# Patient Record
Sex: FEMALE | Race: WHITE | Employment: FULL TIME | URBAN - METROPOLITAN AREA
[De-identification: names, ages, dates, MRNs, and addresses within clinical notes are randomized per-mention and may not be internally consistent; named-entity substitution may affect disease eponyms.]

---

## 2017-01-16 ENCOUNTER — TELEPHONE (OUTPATIENT)
Dept: OTOLARYNGOLOGY | Facility: CLINIC | Age: 62
End: 2017-01-16

## 2017-01-16 NOTE — TELEPHONE ENCOUNTER
Reason for call: Patient needs documentation/prescription for hearing aid. She is planning to purchase at [facility]. Phone number for [facility] is [phone]. For questions or next steps please contact patient at home and leave detailed message.    Phone Number Pt can be reached at: Home number on file [phone] (home)  Best Time: anytime  Can we leave a detailed message on this number? YES

## 2017-01-17 NOTE — TELEPHONE ENCOUNTER
Spoke with [facility]. They state they dont need a hearing aid clearance form but since the patient is requesting it its ok to send it anyway. No fax available. Will be sending form out by mail.    Maye Magana MA

## 2017-03-16 ENCOUNTER — TELEPHONE (OUTPATIENT)
Dept: OTOLARYNGOLOGY | Facility: CLINIC | Age: 62
End: 2017-03-16

## 2017-03-16 NOTE — TELEPHONE ENCOUNTER
Reason for Call:  Other call back    Detailed comments: [facility] called to discuss the prescription for the left hearing aid, Patient states this was needed for insurance purpose.  Please call to discuss further    Phone Number Patient can be reached at: Other phone number:  [phone]    Best Time: today    Can we leave a detailed message on this number? Not Applicable    Call taken on 3/16/2017 at 10:10 AM by Marycarmen Ramos

## 2017-03-17 NOTE — TELEPHONE ENCOUNTER
Spoke with [facility] on 1/16/17 (see telephone encounter). They stated they didnt need a hearing aid clearance form, but since the patient is requesting it it was ok to send it anyway. No fax number is available so I sent the form out by mail.     Will contact [facility] once again to discuss. They should have received the prescription back in January. If not, a new one will be mailed.

## 2017-03-21 ENCOUNTER — TELEPHONE (OUTPATIENT)
Dept: OTOLARYNGOLOGY | Facility: CLINIC | Age: 62
End: 2017-03-21

## 2017-03-21 NOTE — TELEPHONE ENCOUNTER
Reason for Call:  Other     Detailed comments: Patient states she is still having difficulty to get her hearing purchased from [facility].  Patient would like a call back to discuss same.    Phone Number Patient can be reached at: Home number on file [phone] (home)    Best Time: anytime    Can we leave a detailed message on this number? YES    Call taken on 3/21/2017 at 5:16 PM by Emily Greenwood

## 2017-03-23 ENCOUNTER — TELEPHONE (OUTPATIENT)
Dept: OTOLARYNGOLOGY | Facility: CLINIC | Age: 62
End: 2017-03-23

## 2017-03-23 NOTE — TELEPHONE ENCOUNTER
Reason for Call:  Other call back    Detailed comments: Divina from [facility] has questions regarding a form that was sent to the patient. Please call Divina to clear her confusion regarding forms so the patient is not billed incorrectly.     Phone Number Patient can be reached at: Other phone number:  [phone]    Best Time: any    Can we leave a detailed message on this number? YES    Call taken on 3/23/2017 at 1:47 PM by Aryan Dominique

## 2017-03-23 NOTE — TELEPHONE ENCOUNTER
Patient is checking status of previous message. Please return call after 2:30 pm today. States [facility] needs a prescription for hearing aid. Thank you.

## 2017-03-23 NOTE — TELEPHONE ENCOUNTER
Spoke with patient. Informed her that I spoke with [facility] on 1/16/17 (see telephone encounter). They stated they didnt need a hearing aid clearance form, but since the patient is requesting it it was ok to send it anyway. No fax number is available so I sent the form out by mail. Patient states that they did receive the form. Patient informed that this is our prescription for hearing aids. That it should be submitted to her insurance company for coverage. Patient voiced understanding. She has an appointment with [facility] later today and will follow up with them.

## 2017-03-29 NOTE — TELEPHONE ENCOUNTER
Spoke with Divina from [facility]. Another Hearing aid clearance form was mailed out along with hearing test and office visit note for insurance purposes.    Maye Magana MA

## 2018-02-28 ENCOUNTER — OFFICE VISIT (OUTPATIENT)
Dept: FAMILY MEDICINE | Facility: CLINIC | Age: 63
End: 2018-02-28
Payer: COMMERCIAL

## 2018-02-28 ENCOUNTER — RADIANT APPOINTMENT (OUTPATIENT)
Dept: MAMMOGRAPHY | Facility: CLINIC | Age: 63
End: 2018-02-28
Payer: COMMERCIAL

## 2018-02-28 VITALS
SYSTOLIC BLOOD PRESSURE: 122 MMHG | WEIGHT: 172 LBS | DIASTOLIC BLOOD PRESSURE: 70 MMHG | BODY MASS INDEX: 27.64 KG/M2 | TEMPERATURE: 98.7 F | HEART RATE: 72 BPM | HEIGHT: 66 IN

## 2018-02-28 DIAGNOSIS — B37.2 CANDIDAL INTERTRIGO: ICD-10-CM

## 2018-02-28 DIAGNOSIS — B00.1 RECURRENT COLD SORES: ICD-10-CM

## 2018-02-28 DIAGNOSIS — Z80.0 FAMILY HX OF COLON CANCER: ICD-10-CM

## 2018-02-28 DIAGNOSIS — E55.9 VITAMIN D DEFICIENCY: ICD-10-CM

## 2018-02-28 DIAGNOSIS — F10.90 ALCOHOL PROBLEM DRINKING: ICD-10-CM

## 2018-02-28 DIAGNOSIS — E78.5 HYPERLIPIDEMIA LDL GOAL <160: ICD-10-CM

## 2018-02-28 DIAGNOSIS — N89.8 VAGINAL IRRITATION: ICD-10-CM

## 2018-02-28 DIAGNOSIS — Z12.31 VISIT FOR SCREENING MAMMOGRAM: ICD-10-CM

## 2018-02-28 DIAGNOSIS — N39.3 FEMALE STRESS INCONTINENCE: ICD-10-CM

## 2018-02-28 DIAGNOSIS — Z23 NEED FOR PROPHYLACTIC VACCINATION WITH TETANUS-DIPHTHERIA (TD): ICD-10-CM

## 2018-02-28 DIAGNOSIS — Z00.01 ENCOUNTER FOR PREVENTATIVE ADULT HEALTH CARE EXAM WITH ABNORMAL FINDINGS: Primary | ICD-10-CM

## 2018-02-28 DIAGNOSIS — Z13.1 SCREENING FOR DIABETES MELLITUS: ICD-10-CM

## 2018-02-28 PROCEDURE — 36415 COLL VENOUS BLD VENIPUNCTURE: CPT | Performed by: FAMILY MEDICINE

## 2018-02-28 PROCEDURE — 80061 LIPID PANEL: CPT | Performed by: FAMILY MEDICINE

## 2018-02-28 PROCEDURE — 77067 SCR MAMMO BI INCL CAD: CPT | Mod: TC

## 2018-02-28 PROCEDURE — 82947 ASSAY GLUCOSE BLOOD QUANT: CPT | Performed by: FAMILY MEDICINE

## 2018-02-28 PROCEDURE — 82306 VITAMIN D 25 HYDROXY: CPT | Performed by: FAMILY MEDICINE

## 2018-02-28 PROCEDURE — 80076 HEPATIC FUNCTION PANEL: CPT | Performed by: FAMILY MEDICINE

## 2018-02-28 PROCEDURE — 90715 TDAP VACCINE 7 YRS/> IM: CPT | Performed by: FAMILY MEDICINE

## 2018-02-28 PROCEDURE — 99396 PREV VISIT EST AGE 40-64: CPT | Performed by: FAMILY MEDICINE

## 2018-02-28 RX ORDER — ACYCLOVIR 200 MG/1
200-400 CAPSULE ORAL
Qty: 25 CAPSULE | Refills: 1 | Status: SHIPPED | OUTPATIENT
Start: 2018-02-28 | End: 2020-07-13

## 2018-02-28 RX ORDER — METRONIDAZOLE 7.5 MG/G
GEL TOPICAL
Qty: 30 G | Refills: 0 | Status: SHIPPED | OUTPATIENT
Start: 2018-02-28 | End: 2020-07-13

## 2018-02-28 NOTE — MR AVS SNAPSHOT
After Visit Summary   2/28/2018    Shayne Douglass    MRN: [id number]           Patient Information     Date Of Birth          1955        Visit Information        Provider Department      2/28/2018 12:20 PM Amber Gracia MD [facility]        Today's Diagnoses     Encounter for preventative adult health care exam with abnormal findings    -  1    Candidal intertrigo        Vaginal irritation        Recurrent cold sores        Vitamin D deficiency        Need for prophylactic vaccination with tetanus-diphtheria (TD)        Hyperlipidemia LDL goal <160        Family hx of colon cancer        Female stress incontinence        Screening for diabetes mellitus        Alcohol problem drinking          Care Instructions      Preventive Health Recommendations  Female Ages 50 - 64    Yearly exam: See your health care provider every year in order to  o Review health changes.   o Discuss preventive care.    o Review your medicines if your doctor has prescribed any.      Get a Pap test every three years (unless you have an abnormal result and your provider advises testing more often).    If you get Pap tests with HPV test, you only need to test every 5 years, unless you have an abnormal result.     You do not need a Pap test if your uterus was removed (hysterectomy) and you have not had cancer.    You should be tested each year for STDs (sexually transmitted diseases) if you're at risk.     Have a mammogram every 1 to 2 years.    Have a colonoscopy at age 50, or have a yearly FIT test (stool test). These exams screen for colon cancer.      Have a cholesterol test every 5 years, or more often if advised.    Have a diabetes test (fasting glucose) every three years. If you are at risk for diabetes, you should have this test more often.     If you are at risk for osteoporosis (brittle bone disease), think about having a bone density scan (DEXA).    Shots: Get a flu shot each year. Get a  tetanus shot every 10 years.    Nutrition:     Eat at least 5 servings of fruits and vegetables each day.    Eat whole-grain bread, whole-wheat pasta and brown rice instead of white grains and rice.    Talk to your provider about Calcium and Vitamin D.     Lifestyle    Exercise at least 150 minutes a week (30 minutes a day, 5 days a week). This will help you control your weight and prevent disease.    Limit alcohol to one drink per day.    No smoking.     Wear sunscreen to prevent skin cancer.     See your dentist every six months for an exam and cleaning.    See your eye doctor every 1 to 2 years.    [facility]   Discharged by : Kirti STOLL MA    If you have any questions regarding your visit please contact your care team:     Team Gold                Clinic Hours Telephone Number     Dr. Renae Goldsmith 7am-7pm  Monday - Thursday   7am-5pm  Fridays  [phone]   (Appointment scheduling available 24/7)     RN Line  [phone] option 2     Urgent Care - [place] [facility] - 11am-9pm Monday-Friday Saturday-Sunday- 9am-5pm     [place] -   5pm-9pm Monday-Friday Saturday-Sunday- 9am-5pm    [phone] - [place]    [phone] - [place]       For a Price Quote for your services, please call our Consumer Price Line at [phone].     What options do I have for visits at the clinic other than the traditional office visit?     To expand how we care for you, many of our providers are utilizing electronic visits (e-visits) and telephone visits, when medically appropriate, for interactions with their patients rather than a visit in the clinic. We also offer nurse visits for many medical concerns. Just like any other service, we will bill your insurance company for this type of visit based on time spent on the phone with your provider. Not all insurance companies cover these visits. Please  check with your medical insurance if this type of visit is covered. You will be responsible for any charges that are not paid by your insurance.   E-visits via [employer]: generally incur a $35.00 fee.     Telephone visits:   Time spent on the phone: *charged based on time that is spent on the phone in increments of 10 minutes. Estimated cost:   5-10 mins $30.00   11-20 mins. $59.00   21-30 mins. $85.00     Use Sumomi (secure email communication and access to your chart) to send your primary care provider a message or make an appointment. Ask someone on your Team how to sign up for Sumomi.     As always, Thank you for trusting us with your health care needs!      [facility]:    Scheduling Appointments  [place] [facility]  Call: [phone]    Janell Andrew [facility]  Call: [phone]    [facility]  Call: [phone]    For Gastroenterology referrals   [facility]   Clinics and Surgery Center, 4th Floor   [address] [place] [address]   Appointments: [phone]    WHERE TO GO FOR CARE?  Clinic    Make an appointment if you:       Are sick (cold, cough, flu, sore throat, earache or in pain).       Have a small injury (sprain, small cut, burn or broken bone).       Need a physical exam, Pap smear, vaccine or prescription refill.       Have questions about your health or medicines.    To reach us:      Call [phone] ([phone]). Open 24 hours every day. (For counseling services, call [phone].)    Log into Sumomi at [email or web address]. (Visit [email or web address] to create an account.) Hospital emergency room    An emergency is a serious or life- threatening problem that must be treated right away.    Call 216 or get to the hospital if you have:      Very bad or sudden:            - Chest pain or pressure         - Bleeding         - Head or belly pain         - Dizziness or trouble seeing,  walking or                          Speaking      Problems breathing      Blood in your vomit or you are coughing up blood      A major injury (knocked out, loss of a finger or limb, rape, broken bone protruding from skin)    A mental health crisis. (Or call the Mental Health Crisis line at 1-329.553.2532 or Suicide Prevention Hotline at 1-616.766.5928.)    Open 24 hours every day. You don't need an appointment.     Urgent care    Visit urgent care for sickness or small injuries when the clinic is closed. You don't need an appointment. To check hours or find an urgent care near you, visit [email or web address]. Online care    Get online care from Think[employer]ue for more than 70 common problems, like colds, allergies and infections. Open 24 hours every day at:   [email or web address]   Need help deciding?    For advice about where to be seen, you may call your clinic and ask to speak with a nurse. We're here for you 24 hours every day.         If you are deaf or hard of hearing, please let us know. We provide many free services including sign language interpreters, oral interpreters, TTYs, telephone amplifiers, note takers and written materials.                     Follow-ups after your visit        Additional Services     UROLOGY ADULT REFERRAL       Your provider has referred you to: Dr. Ronni White, [facility]: [facility] [phone]   [email or web address]    Please be aware that coverage of these services is subject to the terms and limitations of your health insurance plan.  Call member services at your health plan with any benefit or coverage questions.      Please bring the following with you to your appointment:    (1) Any X-Rays, CTs or MRIs which have been performed.  Contact the facility where they were done to arrange for  prior to your scheduled appointment.    (2) List of current medications  (3) This referral request   (4) Any documents/labs given to you  "for this referral                  Who to contact     If you have questions or need follow up information about today's clinic visit or your schedule please contact [facility] directly at [phone].  Normal or non-critical lab and imaging results will be communicated to you by MyChart, letter or phone within 4 business days after the clinic has received the results. If you do not hear from us within 7 days, please contact the clinic through MyChart or phone. If you have a critical or abnormal lab result, we will notify you by phone as soon as possible.  Submit refill requests through Booster Pack or call your pharmacy and they will forward the refill request to us. Please allow 3 business days for your refill to be completed.          Additional Information About Your Visit        [facility]"[employer] Information     Booster Pack lets you send messages to your doctor, view your test results, renew your prescriptions, schedule appointments and more. To sign up, go to [email or web address] . Click on \"Log in\" on the left side of the screen, which will take you to the Welcome page. Then click on \"Sign up Now\" on the right side of the page.     You will be asked to enter the access code listed below, as well as some personal information. Please follow the directions to create your username and password.     Your access code is: [email or web address]  Expires: 2018 12:44 PM     Your access code will  in 90 days. If you need help or a new code, please call your [place] clinic or [phone].        Care EveryWhere ID     This is your Care EveryWhere ID. This could be used by other organizations to access your [place] medical records  [id number]        Your Vitals Were     Pulse Temperature Height BMI (Body Mass Index)          72 98.7  F (37.1  C) (Oral) 5' 6\" (1.676 m) 27.76 kg/m2         Blood Pressure from Last 3 Encounters:   18 122/70   16 128/66   16 114/76    Weight from Last 3 Encounters: "   02/28/18 172 lb (78 kg)   06/23/16 167 lb (75.8 kg)   06/22/16 167 lb (75.8 kg)              We Performed the Following     Glucose     Hepatic panel     Lipid panel reflex to direct LDL Fasting     TDAP VACCINE (ADACEL)     UROLOGY ADULT REFERRAL     Vitamin D Deficiency          Where to get your medicines      These [place] [facility] [place] [address]  [address] [facility] [facility] [address]     Phone:  [phone]     acyclovir 200 MG capsule    metroNIDAZOLE 0.75 % topical gel         Some of these will need a paper prescription and others can be bought over the counter.  Ask your nurse if you have questions.     Bring a paper prescription for each of these medications     order for DME          Primary Care Provider Office Phone # Fax #    Amber Gracia [phone]       [address] [place] [address]        Equal Access to Services     MILAN HOLT : Hadii greyson lin hadasho Soomaali, waaxda luqadaha, qaybta kaalmada adeegyada, waxay garthin haycorrien li vasquez. So [facility] [phone].    ATENCIÓN: Si habla español, tiene a garcia disposición servicios gratuitos de asistencia lingüística. Llame al [phone].    We comply with applicable federal civil rights laws and [place] laws. We do not discriminate on the basis of race, color, national origin, age, disability, sex, sexual orientation, or gender identity.            Thank you!     Thank you for choosing [facility]  for your care. Our goal is always to provide you with excellent care. Hearing back from our patients is one way we can continue to improve our services. Please take a few minutes to complete the written survey that you may receive in the mail after your visit with us. Thank you!             Your Updated Medication List - Protect others around you: Learn how to safely use, store and throw away your medicines at  www.disposemymeds.org.          This list is accurate as of 2/28/18 12:44 PM.  Always use your most recent med list.                   Brand Name Dispense Instructions for use Diagnosis    acyclovir 200 MG capsule    ZOVIRAX    25 capsule    Take 1-2 capsules (200-400 mg) by mouth 5 times daily TAKE AS DIRECTED FOR COLD SORES    Recurrent cold sores       ascorbic acid 1000 MG Tabs    vitamin C     Take 1,000 mg by mouth daily.        CALCIUM 1200 PO      Take  by mouth daily.        fish oil-omega-3 fatty acids 1000 MG capsule      ONCE DAILY        MAGNESIUM PO      Take  by mouth.        metroNIDAZOLE 0.75 % topical gel    METROGEL    30 g    Apply topically daily as needed.    Vaginal irritation       Multi-vitamin Tabs tablet      Take 1 tablet by mouth daily.        order for DME     30 g    Profile Rx: patient will contact pharmacy when needed   Hydrocortisone 2.5% cream and then added 0.3 grams of Clotrimazole Powder to make it 1%. Previous pharmacy:  [facility] and the pharmacist said that they used 30 grams    Candidal intertrigo       predniSONE 20 MG tablet    DELTASONE    28 tablet    Take 60 mg daily for 7 days, 40 mg daily for 2 days, 20 mg daily for 2 days, 10 mg daily for 2 days    Asymmetrical sensorineural hearing loss       VITAMIN D-3 PO      Take 4,000 mg by mouth.

## 2018-02-28 NOTE — LETTER
[facility]  [address] [place] [place]  [address]  [phone]        March 9, 2018    Shayne Douglass  [address] [place] [address]        Dear Dr. Basil Bella is no longer with the clinic but I wanted to get results back to you.  Everything looks good except for the high cholesterol and that has been stable over the years.  Please let us know if you have any other questions.     Results for orders placed or performed in visit on 02/28/18   Lipid panel reflex to direct LDL Fasting   Result Value Ref Range    Cholesterol 267 (H) <200 mg/dL    Triglycerides 123 <150 mg/dL    HDL Cholesterol 57 >49 mg/dL    LDL Cholesterol Calculated 185 (H) <100 mg/dL    Non HDL Cholesterol 210 (H) <130 mg/dL   Glucose   Result Value Ref Range    Glucose 86 70 - 99 mg/dL   Vitamin D Deficiency   Result Value Ref Range    Vitamin D Deficiency screening 42 20 - 75 ug/L   Hepatic panel   Result Value Ref Range    Bilirubin Direct 0.1 0.0 - 0.2 mg/dL    Bilirubin Total 0.5 0.2 - 1.3 mg/dL    Albumin 4.3 3.4 - 5.0 g/dL    Protein Total 7.7 6.8 - 8.8 g/dL    Alkaline Phosphatase 74 40 - 150 U/L    ALT 34 0 - 50 U/L    AST 26 0 - 45 U/L   If you have any questions please call the clinic at [phone].    Sincerely,  Mario Ott MD  NMW

## 2018-02-28 NOTE — PROGRESS NOTES
SUBJECTIVE:   CC: Shayne Douglass is an 62 year old woman who presents for preventive health visit.     Physical   Annual:     Getting at least 3 servings of Calcium per day::  Yes    Bi-annual eye exam::  NO    Dental care twice a year::  Yes    Sleep apnea or symptoms of sleep apnea::  None    Diet::  Regular (no restrictions)    Frequency of exercise::  1 day/week    Duration of exercise::  Less than 15 minutes    Taking medications regularly::  Yes    Medication side effects::  None    Additional concerns today::  YES              Patient is interested in talking about urology referral, has had bladder leakage issues, stress incontinence.  Has to wear a pad when golfing  Looking for non surgical options    Vaginal irritation-uses metronidazole twice a day  Rash-uses topical cream, minimally    Today's PHQ-2 Score:   PHQ-2 ( 1999 Pfizer) 2/28/2018   Q1: Little interest or pleasure in doing things 0   Q2: Feeling down, depressed or hopeless 0   PHQ-2 Score 0   Q1: Little interest or pleasure in doing things Not at all   Q2: Feeling down, depressed or hopeless Not at all   PHQ-2 Score 0       Abuse: Current or Past(Physical, Sexual or Emotional)- No  Do you feel safe in your environment - Yes    Social History   Substance Use Topics     Smoking status: Never Smoker     Smokeless tobacco: Never Used     Alcohol use Yes      Comment: 20 drinks per week     Alcohol Use 2/28/2018   AUDIT SCORE  15     AUDIT - Alcohol Use Disorders Identification Test - Reproduced from the World Health Organization Audit 2001 (Second Edition) 2/28/2018   1.  How often do you have a drink containing alcohol? 4 or more times a week   2.  How many drinks containing alcohol do you have on a typical day when you are drinking? 1 or 2   3.  How often do you have five or more drinks on one occasion? Monthly   4.  How often during the last year have you found that you were not able to stop drinking once you had started? Monthly   5.  How  often during the last year have you failed to do what was normally expected of you because of drinking? Less than monthly   6.  How often during the last year have you needed a first drink in the morning to get yourself going after a heavy drinking session? Never   7.  How often during the last year have you had a feeling of guilt or remorse after drinking? Less than monthly   8.  How often during the last year have you been unable to remember what happened the night before because of your drinking? Less than monthly   9.  Have you or someone else been injured because of your drinking? No   10. Has a relative, friend, doctor or other health care worker been concerned about your drinking or suggested you cut down? Yes, during the last year   TOTAL SCORE 15       Reviewed orders with patient.  Reviewed health maintenance and updated orders accordingly - Yes  Labs reviewed in EPIC  BP Readings from Last 3 Encounters:   02/28/18 122/70   06/22/16 128/66   05/25/16 114/76    Wt Readings from Last 3 Encounters:   02/28/18 172 lb (78 kg)   06/23/16 167 lb (75.8 kg)   06/22/16 167 lb (75.8 kg)                  Patient Active Problem List   Diagnosis     Family hx of colon cancer     Recurrent cold sores     Vitamin D deficiency     Alcohol problem drinking     Abnormal Pap smear of cervix     Bunion     Hyperlipidemia LDL goal <160     Advanced directives, counseling/discussion     Localized osteoarthrosis, shoulder region     Scapular dyskinesis     Impingement syndrome, shoulder     Pain in joint, shoulder region     Family history of breast cancer in sister     Chronic low back pain with sciatica, sciatica laterality unspecified, unspecified back pain laterality     Past Surgical History:   Procedure Laterality Date     NO HISTORY OF SURGERY         Social History   Substance Use Topics     Smoking status: Never Smoker     Smokeless tobacco: Never Used     Alcohol use Yes      Comment: 20 drinks per week     Family  History   Problem Relation Age of Onset     DIABETES Father      Cardiovascular Father      COPD     Cancer - colorectal Mother      Breast Cancer Sister      Ductal Sarcoma- stage 4     Hypertension No family hx of          Current Outpatient Prescriptions   Medication Sig Dispense Refill     predniSONE (DELTASONE) 20 MG tablet Take 60 mg daily for 7 days, 40 mg daily for 2 days, 20 mg daily for 2 days, 10 mg daily for 2 days 28 tablet 0     order for DME Profile Rx: patient will contact pharmacy when needed      Hydrocortisone 2.5% cream and then added 0.3 grams of Clotrimazole Powder to make it 1%.  [facility] and the pharmacist said that they used 30 grams 30 g 0     acyclovir (ZOVIRAX) 200 MG capsule Take 1-2 capsules (200-400 mg) by mouth 5 times daily TAKE AS DIRECTED FOR COLD SORES 25 capsule 1     metroNIDAZOLE (METROGEL) 0.75 % gel Apply topically daily as needed. 30 g 0     fish oil-omega-3 fatty acids (FISH OIL) 1000 MG capsule ONCE DAILY       multivitamin, therapeutic with minerals (MULTI-VITAMIN) TABS Take 1 tablet by mouth daily.       MAGNESIUM PO Take  by mouth.       Cholecalciferol (VITAMIN D-3 PO) Take 4,000 mg by mouth.       ascorbic acid (VITAMIN C) 1000 MG TABS Take 1,000 mg by mouth daily.       Calcium Carbonate-Vit D-Min (CALCIUM 1200 PO) Take  by mouth daily.       [DISCONTINUED] ORDER FOR DME Profile Rx: patient will contact pharmacy when needed      Hydrocortisone 2.5% cream and then added 0.3 grams of Clotrimazole Powder to make it 1%.  Previous pharmacy:  [facility] and the pharmacist said that they used 30 grams 30 g 3       Patient over age 50, mutual decision to screen reflected in health maintenance.    Pertinent mammograms are reviewed under the imaging tab.  History of abnormal Pap smear: NO - age 30- 65 PAP every 3 years recommended    Reviewed and updated as needed this visit by clinical staff  Tobacco  Allergies  Meds  Med Hx  " Surg Hx  Fam Hx  Soc Hx        Reviewed and updated as needed this visit by Provider            Review of Systems  C: NEGATIVE for fever, chills, change in weight  I: NEGATIVE for worrisome rashes, moles or lesions  E: NEGATIVE for vision changes or irritation  ENT: NEGATIVE for ear, mouth and throat problems  R: NEGATIVE for significant cough or SOB  B: NEGATIVE for masses, tenderness or discharge  CV: NEGATIVE for chest pain, palpitations or peripheral edema  GI: NEGATIVE for nausea, abdominal pain, heartburn, or change in bowel habits  : NEGATIVE for unusual urinary or vaginal symptoms. No vaginal bleeding.  M: NEGATIVE for significant arthralgias or myalgia  N: NEGATIVE for weakness, dizziness or paresthesias  P: NEGATIVE for changes in mood or affect      OBJECTIVE:   /70  Pulse 72  Temp 98.7  F (37.1  C) (Oral)  Ht 5' 6\" (1.676 m)  Wt 172 lb (78 kg)  BMI 27.76 kg/m2  Physical Exam  GENERAL: healthy, alert and no distress  HENT: ear canals and TM's normal, nose and mouth without ulcers or lesions  NECK: no adenopathy, no asymmetry, masses, or scars and thyroid normal to palpation  RESP: lungs clear to auscultation - no rales, rhonchi or wheezes  BREAST: normal without masses, tenderness or nipple discharge and no palpable axillary masses or adenopathy  CV: regular rate and rhythm, normal S1 S2, no S3 or S4, no murmur, click or rub, no peripheral edema and peripheral pulses strong  ABDOMEN: soft, nontender, no hepatosplenomegaly, no masses and bowel sounds normal  MS: no gross musculoskeletal defects noted, no edema  SKIN: no suspicious lesions or rashes  NEURO: Normal strength and tone, mentation intact and speech normal  PSYCH: mentation appears normal, affect normal/bright    ASSESSMENT/PLAN:   1. Encounter for preventative adult health care exam with abnormal findings      2. Candidal intertrigo  Chronic, stable, well controlled, continue current medication, refill done if needed    - order " for DME; Profile Rx: patient will contact pharmacy when needed      Hydrocortisone 2.5% cream and then added 0.3 grams of Clotrimazole Powder to make it 1%.  [facility] pharmacy:  [facility] and the pharmacist said that they used 30 grams  Dispense: 30 g; Refill: 0    3. Vaginal irritation  Only uses gel about twice yearly  - metroNIDAZOLE (METROGEL) 0.75 % topical gel; Apply topically daily as needed.  Dispense: 30 g; Refill: 0    4. Recurrent cold sores  Chronic, stable, well controlled, continue current medication, refill done if needed    - acyclovir (ZOVIRAX) 200 MG capsule; Take 1-2 capsules (200-400 mg) by mouth 5 times daily TAKE AS DIRECTED FOR COLD SORES  Dispense: 25 capsule; Refill: 1    5. Vitamin D deficiency    - Vitamin D Deficiency    6. Need for prophylactic vaccination with tetanus-diphtheria (TD)    - TDAP VACCINE (ADACEL)    7. Hyperlipidemia LDL goal <160    - Lipid panel reflex to direct LDL Fasting    8. Family hx of colon cancer  Will be scheduling colonoscopy this year    9. Female stress incontinence    - UROLOGY ADULT REFERRAL    10. Screening for diabetes mellitus    - Glucose    11. Alcohol problem drinking  Is not interested in any resources at this time to cut down on alcohol.  We have discussed them in the past.  - Hepatic panel    COUNSELING:  Reviewed preventive health counseling, as reflected in patient instructions       Regular exercise       Healthy diet/nutrition       Vision screening       Hearing screening       Immunizations    Vaccinated for: TDAP             Alcohol Use       Colon cancer screening       (Kristie)menopause management       The 10-year ASCVD risk score (Renata SILVA Jr, et al., 2013) is: 3.4%    Values used to calculate the score:      Age: 62 years      Sex: Female      Is Non- : No      Diabetic: No      Tobacco smoker: No      Systolic Blood Pressure: 122 mmHg      Is BP treated: No      HDL Cholesterol: 61 mg/dL      Total  "Cholesterol: 254 mg/dL       Advance Care Planning    BP Screening:   Last 3 BP Readings:    BP Readings from Last 3 Encounters:   02/28/18 122/70   06/22/16 128/66   05/25/16 114/76       The following was recommended to the patient:  Re-screen BP within a year and recommended lifestyle modifications     reports that she has never smoked. She has never used smokeless tobacco.    Estimated body mass index is 27.76 kg/(m^2) as calculated from the following:    Height as of this encounter: 5' 6\" (1.676 m).    Weight as of this encounter: 172 lb (78 kg).   Weight management plan: Discussed healthy diet and exercise guidelines and patient will follow up in 12 months in clinic to re-evaluate.    Counseling Resources:  ATP IV Guidelines  Pooled Cohorts Equation Calculator  Breast Cancer Risk Calculator  FRAX Risk Assessment  ICSI Preventive Guidelines  Dietary Guidelines for Americans, 2010  USDA's MyPlate  ASA Prophylaxis  Lung CA Screening    Amber Gracia MD  [facility]  "

## 2018-02-28 NOTE — NURSING NOTE

## 2018-02-28 NOTE — PATIENT INSTRUCTIONS
Preventive Health Recommendations  Female Ages 50 - 64    Yearly exam: See your health care provider every year in order to  o Review health changes.   o Discuss preventive care.    o Review your medicines if your doctor has prescribed any.      Get a Pap test every three years (unless you have an abnormal result and your provider advises testing more often).    If you get Pap tests with HPV test, you only need to test every 5 years, unless you have an abnormal result.     You do not need a Pap test if your uterus was removed (hysterectomy) and you have not had cancer.    You should be tested each year for STDs (sexually transmitted diseases) if you're at risk.     Have a mammogram every 1 to 2 years.    Have a colonoscopy at age 50, or have a yearly FIT test (stool test). These exams screen for colon cancer.      Have a cholesterol test every 5 years, or more often if advised.    Have a diabetes test (fasting glucose) every three years. If you are at risk for diabetes, you should have this test more often.     If you are at risk for osteoporosis (brittle bone disease), think about having a bone density scan (DEXA).    Shots: Get a flu shot each year. Get a tetanus shot every 10 years.    Nutrition:     Eat at least 5 servings of fruits and vegetables each day.    Eat whole-grain bread, whole-wheat pasta and brown rice instead of white grains and rice.    Talk to your provider about Calcium and Vitamin D.     Lifestyle    Exercise at least 150 minutes a week (30 minutes a day, 5 days a week). This will help you control your weight and prevent disease.    Limit alcohol to one drink per day.    No smoking.     Wear sunscreen to prevent skin cancer.     See your dentist every six months for an exam and cleaning.    See your eye doctor every 1 to 2 years.    [facility]   Discharged by : Kirti STOLL MA    If you have any questions regarding your visit please contact your care team:     Team Gold                 Clinic Hours Telephone Number     Dr. Renae Goldsmith 7am-7pm  Monday - Thursday   7am-5pm  Fridays  [phone]   (Appointment scheduling available 24/7)     RN Line  [phone] option 2     [facility] [place] [facility] [place] 11am-9pm Monday-Friday Saturday-Sunday- 9am-5pm     [place] -   5pm-9pm Monday-Friday Saturday-Sunday- 9am-5pm    [phone] - [place]    [phone] - [place]       For a Price Quote for your services, please call our Consumer Price Line at [phone].     What options do I have for visits at the clinic other than the traditional office visit?     To expand how we care for you, many of our providers are utilizing electronic visits (e-visits) and telephone visits, when medically appropriate, for interactions with their patients rather than a visit in the clinic. We also offer nurse visits for many medical concerns. Just like any other service, we will bill your insurance company for this type of visit based on time spent on the phone with your provider. Not all insurance companies cover these visits. Please check with your medical insurance if this type of visit is covered. You will be responsible for any charges that are not paid by your insurance.   E-visits via [employer]: generally incur a $35.00 fee.     Telephone visits:   Time spent on the phone: *charged based on time that is spent on the phone in increments of 10 minutes. Estimated cost:   5-10 mins $30.00   11-20 mins. $59.00   21-30 mins. $85.00     Use [employer] (secure email communication and access to your chart) to send your primary care provider a message or make an appointment. Ask someone on your Team how to sign up for [employer].     As always, Thank you for trusting us with your health care needs!      [facility]:    Scheduling Appointments  [facility]  Call:  [phone]    [place] [place]  Call: [phone]    [facility]  Call: [phone]    For Gastroenterology referrals   [facility]   Clinics and Surgery Center, 4th Floor   [address] [place] [address]   Appointments: [phone]    WHERE TO GO FOR CARE?  Clinic    Make an appointment if you:       Are sick (cold, cough, flu, sore throat, earache or in pain).       Have a small injury (sprain, small cut, burn or broken bone).       Need a physical exam, Pap smear, vaccine or prescription refill.       Have questions about your health or medicines.    To reach us:      Call [phone] ([phone]). Open 24 hours every day. (For counseling services, call [phone].)    Log into [employer] at [employer]. (Visit [email or web address] to create an account.) Hospital emergency room    An emergency is a serious or life- threatening problem that must be treated right away.    Call [phone] or get to the hospital if you have:      Very bad or sudden:            - Chest pain or pressure         - Bleeding         - Head or belly pain         - Dizziness or trouble seeing, walking or                          Speaking      Problems breathing      Blood in your vomit or you are coughing up blood      A major injury (knocked out, loss of a finger or limb, rape, broken bone protruding from skin)    A mental health crisis. (Or call the Mental Health Crisis line at 1-303.831.8686 or Suicide Prevention Hotline at 1-317.141.6846.)    Open 24 hours every day. You don't need an appointment.     Urgent care    Visit urgent care for sickness or small injuries when the clinic is closed. You don't need an appointment. To check hours or find an urgent care near you, visit [email or web address]. Online care    Get online care from [facility] for more than 70 common problems, like colds, allergies and infections. Open 24 hours every day at:   [email or web address]    Need help deciding?    For advice about where to be seen, you may call your clinic and ask to speak with a nurse. We're here for you 24 hours every day.         If you are deaf or hard of hearing, please let us know. We provide many free services including sign language interpreters, oral interpreters, TTYs, telephone amplifiers, note takers and written materials.

## 2018-02-28 NOTE — NURSING NOTE
"Chief Complaint   Patient presents with     Physical     Health Maintenance     Referral     urology     Refill Request       Initial /70  Pulse 72  Temp 98.7  F (37.1  C) (Oral)  Ht 5' 6\" (1.676 m)  Wt 172 lb (78 kg)  BMI 27.76 kg/m2 Estimated body mass index is 27.76 kg/(m^2) as calculated from the following:    Height as of this encounter: 5' 6\" (1.676 m).    Weight as of this encounter: 172 lb (78 kg).  Medication Reconciliation: complete   Pinky Baxter MA    "

## 2018-03-01 LAB
ALBUMIN SERPL-MCNC: 4.3 G/DL (ref 3.4–5)
ALP SERPL-CCNC: 74 U/L (ref 40–150)
ALT SERPL W P-5'-P-CCNC: 34 U/L (ref 0–50)
AST SERPL W P-5'-P-CCNC: 26 U/L (ref 0–45)
BILIRUB DIRECT SERPL-MCNC: 0.1 MG/DL (ref 0–0.2)
BILIRUB SERPL-MCNC: 0.5 MG/DL (ref 0.2–1.3)
CHOLEST SERPL-MCNC: 267 MG/DL
DEPRECATED CALCIDIOL+CALCIFEROL SERPL-MC: 42 UG/L (ref 20–75)
GLUCOSE SERPL-MCNC: 86 MG/DL (ref 70–99)
HDLC SERPL-MCNC: 57 MG/DL
LDLC SERPL CALC-MCNC: 185 MG/DL
NONHDLC SERPL-MCNC: 210 MG/DL
PROT SERPL-MCNC: 7.7 G/DL (ref 6.8–8.8)
TRIGL SERPL-MCNC: 123 MG/DL

## 2018-03-29 ENCOUNTER — TELEPHONE (OUTPATIENT)
Dept: UROLOGY | Facility: CLINIC | Age: 63
End: 2018-03-29

## 2018-03-29 ENCOUNTER — OFFICE VISIT (OUTPATIENT)
Dept: UROLOGY | Facility: CLINIC | Age: 63
End: 2018-03-29
Payer: COMMERCIAL

## 2018-03-29 VITALS — HEART RATE: 71 BPM | OXYGEN SATURATION: 98 %

## 2018-03-29 DIAGNOSIS — N39.3 SUI (STRESS URINARY INCONTINENCE, FEMALE): Primary | ICD-10-CM

## 2018-03-29 LAB
ALBUMIN UR-MCNC: NEGATIVE MG/DL
APPEARANCE UR: CLEAR
BILIRUB UR QL STRIP: NEGATIVE
COLOR UR AUTO: YELLOW
GLUCOSE UR STRIP-MCNC: NEGATIVE MG/DL
HGB UR QL STRIP: ABNORMAL
KETONES UR STRIP-MCNC: NEGATIVE MG/DL
LEUKOCYTE ESTERASE UR QL STRIP: NEGATIVE
NITRATE UR QL: NEGATIVE
NON-SQ EPI CELLS #/AREA URNS LPF: NORMAL /LPF
PH UR STRIP: 5.5 PH (ref 5–7)
RBC #/AREA URNS AUTO: NORMAL /HPF
SOURCE: ABNORMAL
SP GR UR STRIP: 1.01 (ref 1–1.03)
UROBILINOGEN UR STRIP-ACNC: 0.2 EU/DL (ref 0.2–1)
WBC #/AREA URNS AUTO: NORMAL /HPF

## 2018-03-29 PROCEDURE — 81001 URINALYSIS AUTO W/SCOPE: CPT | Performed by: UROLOGY

## 2018-03-29 PROCEDURE — 99244 OFF/OP CNSLTJ NEW/EST MOD 40: CPT | Mod: 25 | Performed by: UROLOGY

## 2018-03-29 PROCEDURE — 52000 CYSTOURETHROSCOPY: CPT | Performed by: UROLOGY

## 2018-03-29 NOTE — PROGRESS NOTES
Shayne Douglass is a 63 year old female seen in consultation for incont. Consult from Amber Gracia.      Pt reports 15+ yr hx progressive WILLIAMS, now quite bothersome. Wears pad for golf and dancing. No significant urge or insensate components.     Sister had a [place] sling many yrs ago and is very pleased.    Denies dysuria, gross hematuria, frequency, UTI's, prior  eval, hx bladder surgery, use of bladder meds, success with Kegel's.     Hx 3 vag deliveries, no hyster, not on HRT.     Denies constipation and fecal incont.    Moderate fluids; reviewed voiding diary, confirms.    Works in [employer].        Past Medical History:   Diagnosis Date     Diverticulosis      Hearing loss     left hearing aid     Psoriasis      Recurrent cold sores        Past Surgical History:   Procedure Laterality Date     NO HISTORY OF SURGERY         Social History     Social History     Marital status:      Spouse name: N/A     Number of children: N/A     Years of education: N/A     Occupational History     Not on file.     Social History Main Topics     Smoking status: Never Smoker     Smokeless tobacco: Never Used     Alcohol use Yes      Comment: 20 drinks per week     Drug use: No     Sexual activity: Yes     Partners: Male     Other Topics Concern     Parent/Sibling W/ Cabg, Mi Or Angioplasty Before 65f 55m? No     Social History Narrative    .    3 adult children               Current Outpatient Prescriptions   Medication Sig Dispense Refill     order for DME Profile Rx: patient will contact pharmacy when needed      Hydrocortisone 2.5% cream and then added 0.3 grams of Clotrimazole Powder to make it 1%.  Previous pharmacy:  [facility] and the pharmacist said that they used 30 grams 30 g 0     metroNIDAZOLE (METROGEL) 0.75 % topical gel Apply topically daily as needed. 30 g 0     acyclovir (ZOVIRAX) 200 MG capsule Take 1-2 capsules (200-400 mg) by mouth 5 times daily TAKE AS DIRECTED FOR COLD  SORES 25 capsule 1     fish oil-omega-3 fatty acids (FISH OIL) 1000 MG capsule ONCE DAILY       multivitamin, therapeutic with minerals (MULTI-VITAMIN) TABS Take 1 tablet by mouth daily.       MAGNESIUM PO Take  by mouth.       Cholecalciferol (VITAMIN D-3 PO) Take 4,000 mg by mouth.       ascorbic acid (VITAMIN C) 1000 MG TABS Take 1,000 mg by mouth daily.       Calcium Carbonate-Vit D-Min (CALCIUM 1200 PO) Take  by mouth daily.       [DISCONTINUED] ORDER FOR DME Profile Rx: patient will contact pharmacy when needed      Hydrocortisone 2.5% cream and then added 0.3 grams of Clotrimazole Powder to make it 1%.  [facility] and the pharmacist said that they used 30 grams 30 g 3       Physical Exam:    GENL: NAD.    ABD: Soft, non-tender, no masses.    EG: Well-estrogenized, no masses.    VAGINA: Well-estrogenized, no masses.    BN HYPERMOBILITY: Moderate to marked.    CYSTOCELE: Grade 1.    APICAL PROLAPSE: Minimal.    RECTOCELE: Minimal.    BIMANUAL: No mass or tenderness.      Cysto:    (Informed consent obtained. Pause for cause performed)   Sterile prep.    17 Fr scope inserted through urethra. Systematic examination w 70 degree lens.   PVR: 10 cc   MUCOSA: Normal without lesion   ORIFICES: Normal location and morphology   CAPACITY: 550 cc; no pain with filling   Scope withdrawn without untoward effect.      Valsalva:   Moderate to marked hypermobility, moderate leakage noted.    (Pt tolerated procedure without difficulty).      Results for orders placed or performed in visit on 03/29/18   UA reflex to Microscopic   Result Value Ref Range    Color Urine Yellow     Appearance Urine Clear     Glucose Urine Negative NEG^Negative mg/dL    Bilirubin Urine Negative NEG^Negative    Ketones Urine Negative NEG^Negative mg/dL    Specific Gravity Urine 1.010 1.003 - 1.035    Blood Urine Small (A) NEG^Negative    pH Urine 5.5 5.0 - 7.0 pH    Protein Albumin Urine Negative NEG^Negative mg/dL     Urobilinogen Urine 0.2 0.2 - 1.0 EU/dL    Nitrite Urine Negative NEG^Negative    Leukocyte Esterase Urine Negative NEG^Negative    Source Midstream Urine    Urine Microscopic   Result Value Ref Range    WBC Urine 0 - 5 OTO5^0 - 5 /HPF    RBC Urine O - 2 OTO2^O - 2 /HPF    Squamous Epithelial /LPF Urine Few FEW^Few /LPF         IMP:  1. WILLIAMS with hypermobility, bothersome      PLAN:  1. Discussed situation with patient in detail.    2. ALTIS SLING DISCUSSION: We discussed the patients situation in detail including her specific anatomy and conditions. Diagrams are drawn.    We discussed the surgical treatment including Altis pubovaginal sling utilizing mesh material. We addressed the technical aspects of the procedure as well as the potential risks and complications incuding, but not limited to bleeding, infection, damage to organs or other injury. We discussed the recovery process and the potential effect on sexual function in detail. She also understands the alternative forms of therapy (including no therapy and treatment without mesh), and the potential need for additional therapy. We discussed the FDA report on the use of surgical mesh. All questions are answered in detail. Informed consent is obtained. Consent form signed. Handout given.    Pt expresses understanding, is eager to proceed prior to golf season    3. 60 minutes spent with patient, more than 50% in counseling and coordination of care for incont, which did not include time spent for the procedure.    4. Copy

## 2018-03-29 NOTE — TELEPHONE ENCOUNTER
Type of surgery: Pubovaginal sling  CPT code 34322  stress urinary incontinence ICD 10. N39.3  Location of surgery: [facility]  Date and time of surgery: 05/07/2018 / JAMES  Surgeon: MARKO White    Pre-Op Appt Date: 05/01/2018  Post-Op Appt Date: 06/07/2018   Packet sent out: Yes  Pre-cert/Authorization completed:  No prior auth required per [employer] list.   Date: 03/29/2018

## 2018-04-25 NOTE — PROGRESS NOTES
[facility]  [address] [place] [address]  [phone]  Dept: [phone]    PRE-OP EVALUATION:  Today's date: 2018    Shayne Douglass (: 1955) presents for pre-operative evaluation assessment as requested by Dr. White.  She requires evaluation and anesthesia risk assessment prior to undergoing surgery/procedure for treatment of Incontinence.    Proposed Surgery/ Procedure: Sling Transvaginal  Date of Surgery/ Procedure: 18  Time of Surgery/ Procedure: 7am  Hospital/Surgical Facility: [facility]  Fax number for surgical facility:   Primary Physician: Amber Gracia  Type of Anesthesia Anticipated: General    Patient has a Health Care Directive or Living Will:  NO    1. NO - Do you have a history of heart attack, stroke, stent, bypass or surgery on an artery in the head, neck, heart or legs?  2. NO - Do you ever have any pain or discomfort in your chest?  3. NO - Do you have a history of  Heart Failure?  4. NO - Are you troubled by shortness of breath when: walking on the level, up a slight hill or at night?  5. NO - Do you currently have a cold, bronchitis or other respiratory infection?  6. NO - Do you have a cough, shortness of breath or wheezing?  7. NO - Do you sometimes get pains in the calves of your legs when you walk?  8. NO - Do you or anyone in your family have previous history of blood clots?  9. NO - Do you or does anyone in your family have a serious bleeding problem such as prolonged bleeding following surgeries or cuts?  10. NO - Have you ever had problems with anemia or been told to take iron pills?  11. NO - Have you had any abnormal blood loss such as black, tarry or bloody stools, or abnormal vaginal bleeding?  12. NO - Have you ever had a blood transfusion?  13. NO - Have you or any of your relatives ever had problems with anesthesia?  14. YES - DO YOU HAVE SLEEP APNEA, EXCESSIVE SNORING OR DAYTIME DROWSINESS? snore  15. NO -  Do you have any prosthetic heart valves?  16. NO - Do you have prosthetic joints?  17. NO - Is there any chance that you may be pregnant?    Sol Anderson CNP     HPI:     HPI related to upcoming procedure: Patient will be undergoing pubovaginal sling placed due to urinary incontinence.      See problem list for active medical problems.  Problems all longstanding and stable, except as noted/documented.  See ROS for pertinent symptoms related to these conditions.         Patient complains of chronic sore to her right nare for many years.  She notes that it is worsening.  Patient feels that her right nare is blocked occasionally.  She does not use flonase and has not seen ENT in the past.                                                                                                                                      MEDICAL HISTORY:     Patient Active Problem List    Diagnosis Date Noted     Chronic low back pain with sciatica, sciatica laterality unspecified, unspecified back pain laterality 05/25/2016     Priority: Medium     Family history of breast cancer in sister 02/19/2014     Priority: Medium     Scapular dyskinesis 03/14/2013     Priority: Medium     Impingement syndrome, shoulder 03/14/2013     Priority: Medium     Pain in joint, shoulder region 03/14/2013     Priority: Medium     Localized osteoarthrosis, shoulder region 03/11/2013     Priority: Medium     Problem list name updated by automated process. Provider to review       Advanced directives, counseling/discussion 02/15/2013     Priority: Medium     Discussed advance care planning with patient; information given to patient to review. 2/15/2013          Bunion 02/22/2012     Priority: Medium     Hyperlipidemia LDL goal <160 02/22/2012     Priority: Medium     Family hx of colon cancer 02/15/2012     Priority: Medium     Vitamin D deficiency 02/15/2012     Priority: Medium     Alcohol problem drinking 02/15/2012     Priority: Medium     Abnormal  Pap smear of cervix 02/15/2012     Priority: Medium     [facility].  Pap 1/2010-normal  Pap 1/2011-normal  2/15/12 normal       Recurrent cold sores      Priority: Medium      Past Medical History:   Diagnosis Date     Diverticulosis      Hearing loss     left hearing aid     Psoriasis      Recurrent cold sores      Past Surgical History:   Procedure Laterality Date     NO HISTORY OF SURGERY       Current Outpatient Prescriptions   Medication Sig Dispense Refill     acyclovir (ZOVIRAX) 200 MG capsule Take 1-2 capsules (200-400 mg) by mouth 5 times daily TAKE AS DIRECTED FOR COLD SORES (Patient not taking: Reported on 5/1/2018) 25 capsule 1     ascorbic acid (VITAMIN C) 1000 MG TABS Take 1,000 mg by mouth daily.       Calcium Carbonate-Vit D-Min (CALCIUM 1200 PO) Take  by mouth daily.       Cholecalciferol (VITAMIN D-3 PO) Take 4,000 mg by mouth.       fish oil-omega-3 fatty acids (FISH OIL) 1000 MG capsule ONCE DAILY       MAGNESIUM PO Take  by mouth.       metroNIDAZOLE (METROGEL) 0.75 % topical gel Apply topically daily as needed. (Patient not taking: Reported on 5/1/2018) 30 g 0     multivitamin, therapeutic with minerals (MULTI-VITAMIN) TABS Take 1 tablet by mouth daily.       order for DME Profile Rx: patient will contact pharmacy when needed      Hydrocortisone 2.5% cream and then added 0.3 grams of Clotrimazole Powder to make it 1%.  [facility] and the pharmacist said that they used 30 grams (Patient not taking: Reported on 5/1/2018) 30 g 0     [DISCONTINUED] ORDER FOR DME Profile Rx: patient will contact pharmacy when needed      Hydrocortisone 2.5% cream and then added 0.3 grams of Clotrimazole Powder to make it 1%.  Previous pharmacy:  [facility] and the pharmacist said that they used 30 grams 30 g 3     OTC products: None, except as noted above    No Known Allergies   Latex Allergy: NO    Social History   Substance Use Topics     Smoking  "status: Never Smoker     Smokeless tobacco: Never Used     Alcohol use Yes      Comment: 20 drinks per week     History   Drug Use No       REVIEW OF SYSTEMS:   Constitutional, neuro, ENT, endocrine, pulmonary, cardiac, gastrointestinal, genitourinary, musculoskeletal, integument and psychiatric systems are negative, except as otherwise noted.    EXAM:   /82  Pulse 81  Temp 97.6  F (36.4  C) (Oral)  Resp 12  Ht 5' 6\" (1.676 m)  Wt 175 lb 3.2 oz (79.5 kg)  SpO2 97%  BMI 28.28 kg/m2    GENERAL APPEARANCE: healthy, alert and no distress     EYES: EOMI, PERRL     HENT: ear canals and TM's normal and nose and mouth without ulcers or lesions     NECK: no adenopathy, no asymmetry, masses, or scars and thyroid normal to palpation     RESP: lungs clear to auscultation - no rales, rhonchi or wheezes     CV: regular rates and rhythm, normal S1 S2, no S3 or S4 and no murmur, click or rub     ABDOMEN:  soft, nontender, no HSM or masses and bowel sounds normal     MS: extremities normal- no gross deformities noted, no evidence of inflammation in joints, FROM in all extremities.     SKIN: no suspicious lesions or rashes     NEURO: Normal strength and tone, sensory exam grossly normal, mentation intact and speech normal     PSYCH: mentation appears normal. and affect normal/bright     LYMPHATICS: No cervical adenopathy    DIAGNOSTICS:     EKG: Not indicated due to non-vascular surgery and low risk of event (age <65 and without cardiac risk factors)  Labs Resulted Today:   Results for orders placed or performed in visit on 05/01/18   Hemoglobin   Result Value Ref Range    Hemoglobin 12.9 11.7 - 15.7 g/dL   *UA reflex to Microscopic and Culture ([place] [facility] (except [place] and [place])   Result Value Ref Range    Color Urine Yellow     Appearance Urine Clear     Glucose Urine Negative NEG^Negative mg/dL    Bilirubin Urine Negative NEG^Negative    Ketones Urine Negative NEG^Negative mg/dL    Specific " Gravity Urine 1.025 1.003 - 1.035    Blood Urine Small (A) NEG^Negative    pH Urine 6.0 5.0 - 7.0 pH    Protein Albumin Urine Negative NEG^Negative mg/dL    Urobilinogen Urine 0.2 0.2 - 1.0 EU/dL    Nitrite Urine Negative NEG^Negative    Leukocyte Esterase Urine Negative NEG^Negative    Source Midstream Urine    Urine Microscopic   Result Value Ref Range    WBC Urine 0 - 5 OTO5^0 - 5 /HPF    RBC Urine O - 2 OTO2^O - 2 /HPF    Hyaline Casts O - 2 OTO2^O - 2 /LPF    Squamous Epithelial /LPF Urine Few FEW^Few /LPF    Mucous Urine Present (A) NEG^Negative /LPF       Recent Labs   Lab Test  02/19/14   0819   HGB  12.9   PLT  233   NA  142   POTASSIUM  4.0   CR  0.75        IMPRESSION:   Reason for surgery/procedure: Urinary incontinence  Diagnosis/reason for consult: Management of comorbid conditions and preoperative exam.      The proposed surgical procedure is considered LOW risk.    REVISED CARDIAC RISK INDEX  The patient has the following serious cardiovascular risks for perioperative complications such as (MI, PE, VFib and 3  AV Block):  No serious cardiac risks  INTERPRETATION: 0 risks: Class I (very low risk - 0.4% complication rate)    The patient has the following additional risks for perioperative complications:  No identified additional risks      ICD-10-CM    1. Preop general physical exam Z01.818 Hemoglobin     Urine Microscopic   2. Urinary incontinence, unspecified type R32 *UA reflex to Microscopic and Culture ([place] [facility] (except [place] and [place])   3. Internal nasal lesion J34.89 OTOLARYNGOLOGY REFERRAL     No ulcer noted on exam.  Will have patient follow-up with ENT regarding concerns.    RECOMMENDATIONS:     --Consult hospital rounder / IM to assist post-op medical management    Cardiovascular Risk  Performs 4 METs exercise without symptoms (Climb a flight of stairs and Walk on level ground at 15 minutes per mile (4 miles/hour)) .       --Patient is on no chronic  medications    APPROVAL GIVEN to proceed with proposed procedure, without further diagnostic evaluation       Signed Electronically by: HAYLEY Coello CNP    Copy of this evaluation report is provided to requesting physician.    [facility] Preop Guidelines    Revised Cardiac Risk Index

## 2018-04-25 NOTE — PATIENT INSTRUCTIONS
Stop aspirin, ibuprofen, aleve, fish oil 5-7 days before surgery.   Do not take any meds on the morning of surgery.    Before Your Surgery      Call your surgeon if there is any change in your health. This includes signs of a cold or flu (such as a sore throat, runny nose, cough, rash or fever).    Do not smoke, drink alcohol or take over the counter medicine (unless your surgeon or primary care doctor tells you to) for the 24 hours before and after surgery.    If you take prescribed drugs: Follow your doctor s orders about which medicines to take and which to stop until after surgery.    Eating and drinking prior to surgery: follow the instructions from your surgeon    Take a shower or bath the night before surgery. Use the soap your surgeon gave you to gently clean your skin. If you do not have soap from your surgeon, use your regular soap. Do not shave or scrub the surgery site.  Wear clean pajamas and have clean sheets on your bed.     [facility]    If you have any questions regarding to your visit please contact your care team:     Team Pink:   Clinic Hours Telephone Number   Internal Medicine:  Dr. Eliz Anderson, NP       7am-7pm  Monday - Thursday   7am-5pm  Fridays  [phone]  (Appointment scheduling available 24/7)    Questions about your recent visit?  Team Line  [phone]   Urgent Care - [place] - 11am-9pm Monday-Friday Saturday-Sunday- 9am-5pm   [place] - 5pm-9pm Monday-Friday Saturday-Sunday- 9am-5pm  [phone] - [place]  [phone] - [place]       What options do I have for a visit other than an office visit? We offer electronic visits (e-visits) and telephone visits, when medically appropriate.  Please check with your medical insurance to see if these types of visits are covered, as you will be responsible for any charges that are not paid by your insurance.      You can use GeoVax (secure  electronic communication) to access to your chart, send your primary care provider a message, or make an appointment. Ask a team member how to get started.     For a price quote for your services, please call our Consumer Price Line at [phone] or our Imaging Cost estimation line at [phone] (for imaging tests).    Allison Carver CMA

## 2018-05-01 ENCOUNTER — OFFICE VISIT (OUTPATIENT)
Dept: FAMILY MEDICINE | Facility: CLINIC | Age: 63
End: 2018-05-01
Payer: COMMERCIAL

## 2018-05-01 VITALS
WEIGHT: 175.2 LBS | HEART RATE: 81 BPM | RESPIRATION RATE: 12 BRPM | SYSTOLIC BLOOD PRESSURE: 130 MMHG | TEMPERATURE: 97.6 F | HEIGHT: 66 IN | OXYGEN SATURATION: 97 % | BODY MASS INDEX: 28.16 KG/M2 | DIASTOLIC BLOOD PRESSURE: 82 MMHG

## 2018-05-01 DIAGNOSIS — J34.89 INTERNAL NASAL LESION: ICD-10-CM

## 2018-05-01 DIAGNOSIS — Z01.818 PREOP GENERAL PHYSICAL EXAM: Primary | ICD-10-CM

## 2018-05-01 DIAGNOSIS — R32 URINARY INCONTINENCE, UNSPECIFIED TYPE: ICD-10-CM

## 2018-05-01 LAB
ALBUMIN UR-MCNC: NEGATIVE MG/DL
APPEARANCE UR: CLEAR
BILIRUB UR QL STRIP: NEGATIVE
COLOR UR AUTO: YELLOW
GLUCOSE UR STRIP-MCNC: NEGATIVE MG/DL
HGB BLD-MCNC: 12.9 G/DL (ref 11.7–15.7)
HGB UR QL STRIP: ABNORMAL
HYALINE CASTS #/AREA URNS LPF: ABNORMAL /LPF
KETONES UR STRIP-MCNC: NEGATIVE MG/DL
LEUKOCYTE ESTERASE UR QL STRIP: NEGATIVE
MUCOUS THREADS #/AREA URNS LPF: PRESENT /LPF
NITRATE UR QL: NEGATIVE
NON-SQ EPI CELLS #/AREA URNS LPF: ABNORMAL /LPF
PH UR STRIP: 6 PH (ref 5–7)
RBC #/AREA URNS AUTO: ABNORMAL /HPF
SOURCE: ABNORMAL
SP GR UR STRIP: 1.02 (ref 1–1.03)
UROBILINOGEN UR STRIP-ACNC: 0.2 EU/DL (ref 0.2–1)
WBC #/AREA URNS AUTO: ABNORMAL /HPF

## 2018-05-01 PROCEDURE — 81001 URINALYSIS AUTO W/SCOPE: CPT | Performed by: NURSE PRACTITIONER

## 2018-05-01 PROCEDURE — 85018 HEMOGLOBIN: CPT | Performed by: NURSE PRACTITIONER

## 2018-05-01 PROCEDURE — 99214 OFFICE O/P EST MOD 30 MIN: CPT | Performed by: NURSE PRACTITIONER

## 2018-05-01 PROCEDURE — 36415 COLL VENOUS BLD VENIPUNCTURE: CPT | Performed by: NURSE PRACTITIONER

## 2018-05-01 ASSESSMENT — ANXIETY QUESTIONNAIRES
1. FEELING NERVOUS, ANXIOUS, OR ON EDGE: SEVERAL DAYS
5. BEING SO RESTLESS THAT IT IS HARD TO SIT STILL: NOT AT ALL
IF YOU CHECKED OFF ANY PROBLEMS ON THIS QUESTIONNAIRE, HOW DIFFICULT HAVE THESE PROBLEMS MADE IT FOR YOU TO DO YOUR WORK, TAKE CARE OF THINGS AT HOME, OR GET ALONG WITH OTHER PEOPLE: NOT DIFFICULT AT ALL
3. WORRYING TOO MUCH ABOUT DIFFERENT THINGS: NOT AT ALL
7. FEELING AFRAID AS IF SOMETHING AWFUL MIGHT HAPPEN: NOT AT ALL
6. BECOMING EASILY ANNOYED OR IRRITABLE: SEVERAL DAYS
2. NOT BEING ABLE TO STOP OR CONTROL WORRYING: NOT AT ALL
GAD7 TOTAL SCORE: 2

## 2018-05-01 ASSESSMENT — PATIENT HEALTH QUESTIONNAIRE - PHQ9: 5. POOR APPETITE OR OVEREATING: NOT AT ALL

## 2018-05-01 NOTE — LETTER
[facility]  [address] [place] [address]    May 2, 2018    Shayne Douglass  [address] [place] [address]    Dear Shayne,    Your test results are normal.     If you have any questions please feel free to contact [phone] or myself via [employer]    Enclosed is a copy of your results.     Results for orders placed or performed in visit on 05/01/18   Hemoglobin   Result Value Ref Range    Hemoglobin 12.9 11.7 - 15.7 g/dL   *UA reflex to Microscopic and Culture ([place] [facility] (except [place] and [place])   Result Value Ref Range    Color Urine Yellow     Appearance Urine Clear     Glucose Urine Negative NEG^Negative mg/dL    Bilirubin Urine Negative NEG^Negative    Ketones Urine Negative NEG^Negative mg/dL    Specific Gravity Urine 1.025 1.003 - 1.035    Blood Urine Small (A) NEG^Negative    pH Urine 6.0 5.0 - 7.0 pH    Protein Albumin Urine Negative NEG^Negative mg/dL    Urobilinogen Urine 0.2 0.2 - 1.0 EU/dL    Nitrite Urine Negative NEG^Negative    Leukocyte Esterase Urine Negative NEG^Negative    Source Midstream Urine    Urine Microscopic   Result Value Ref Range    WBC Urine 0 - 5 OTO5^0 - 5 /HPF    RBC Urine O - 2 OTO2^O - 2 /HPF    Hyaline Casts O - 2 OTO2^O - 2 /LPF    Squamous Epithelial /LPF Urine Few FEW^Few /LPF    Mucous Urine Present (A) NEG^Negative /LPF   If you have any questions or concerns, please call myself or my nurse at [phone].  Sincerely,      Sol Anderson CNP/carmichael

## 2018-05-01 NOTE — MR AVS SNAPSHOT
After Visit Summary   5/1/2018    Shayne Douglass    MRN: [id number]           Patient Information     Date Of Birth          1955        Visit Information        Provider Department      5/1/2018 6:00 PM Sol Anderson APRN [facility]        Today's Diagnoses     Preop general physical exam    -  1    Urinary incontinence, unspecified type        Internal nasal lesion          Care Instructions    Stop aspirin, ibuprofen, aleve, fish oil 5-7 days before surgery.   Do not take any meds on the morning of surgery.    Before Your Surgery      Call your surgeon if there is any change in your health. This includes signs of a cold or flu (such as a sore throat, runny nose, cough, rash or fever).    Do not smoke, drink alcohol or take over the counter medicine (unless your surgeon or primary care doctor tells you to) for the 24 hours before and after surgery.    If you take prescribed drugs: Follow your doctor s orders about which medicines to take and which to stop until after surgery.    Eating and drinking prior to surgery: follow the instructions from your surgeon    Take a shower or bath the night before surgery. Use the soap your surgeon gave you to gently clean your skin. If you do not have soap from your surgeon, use your regular soap. Do not shave or scrub the surgery site.  Wear clean pajamas and have clean sheets on your bed.     [facility]    If you have any questions regarding to your visit please contact your care team:     Team Pink:   Clinic Hours Telephone Number   Internal Medicine:  Dr. Eliz Anderson, NP       7am-7pm  Monday - Thursday   7am-5pm  Fridays  [phone]  (Appointment scheduling available 24/7)    Questions about your recent visit?  Team Line  [phone]   Urgent Care - [place] [facility] - 11am-9pm Monday-Friday Saturday-Sunday- 9am-5pm   [place] - 5pm-9pm  Monday-Friday Saturday-Sunday- 9am-5pm  [phone] - [place]egan Chan  [phone] - [place]       What options do I have for a visit other than an office visit? We offer electronic visits (e-visits) and telephone visits, when medically appropriate.  Please check with your medical insurance to see if these types of visits are covered, as you will be responsible for any charges that are not paid by your insurance.      You can use [employer]obiotix (secure electronic communication) to access to your chart, send your primary care provider a message, or make an appointment. Ask a team member how to get started.     For a price quote for your services, please call our Consumer Price Line at [phone] or our Imaging Cost estimation line at [phone] (for imaging tests).    Allison Carver CMA             Follow-ups after your visit        Additional Services     OTOLARYNGOLOGY REFERRAL       Your provider has referred you to: G: [facility] [phone]   [email or web address]    Please be aware that coverage of these services is subject to the terms and limitations of your health insurance plan.  Call member services at your health plan with any benefit or coverage questions.      Please bring the following with you to your appointment:    (1) Any X-Rays, CTs or MRIs which have been performed.  Contact the facility where they were done to arrange for  prior to your scheduled appointment.   (2) List of current medications  (3) This referral request   (4) Any documents/labs given to you for this referral                  Your next 10 appointments already scheduled     May 07, 2018   Procedure with Ras White MD   [facility] [place] (--)    [address] [place]  [place] [address]   [phone]            Jun 07, 2018  9:00 AM CDT   Return Visit with Ras White MD   [facility] [place] ([facility])    [address] [place] [address]  "[place] [address] [phone]              Who to contact     If you have questions or need follow up information about today's clinic visit or your schedule please contact [facility] directly at [phone].  Normal or non-critical lab and imaging results will be communicated to you by MyChart, letter or phone within 4 business days after the clinic has received the results. If you do not hear from us within 7 days, please contact the clinic through MyChart or phone. If you have a critical or abnormal lab result, we will notify you by phone as soon as possible.  Submit refill requests through [employer]ise Only or call your pharmacy and they will forward the refill request to us. Please allow 3 business days for your refill to be completed.          Additional Information About Your Visit        wriplhart Information     Advise Only lets you send messages to your doctor, view your test results, renew your prescriptions, schedule appointments and more. To sign up, go to [email or web address] . Click on \"Log in\" on the left side of the screen, which will take you to the Welcome page. Then click on \"Sign up Now\" on the right side of the page.     You will be asked to enter the access code listed below, as well as some personal information. Please follow the directions to create your username and password.     Your access code is: [email or web address]  Expires: 2018  1:44 PM     Your access code will  in 90 days. If you need help or a new code, please call your [facility] or [phone].        Care EveryWhere ID     This is your Care EveryWhere ID. This could be used by other organizations to access your [facility] medical records  [id number]        Your Vitals Were     Pulse Temperature Respirations Height Pulse Oximetry BMI (Body Mass Index)    81 97.6  F (36.4  C) (Oral) 12 5' 6\" (1.676 m) 97% 28.28 kg/m2       Blood Pressure from Last 3 Encounters:   18 130/82   18 122/70   16 " 128/66    Weight from Last 3 Encounters:   05/01/18 175 lb 3.2 oz (79.5 kg)   02/28/18 172 lb (78 kg)   06/23/16 167 lb (75.8 kg)              We Performed the Following     *UA reflex to Microscopic and Culture ([place] and [facility] (except [place] and [place])     Hemoglobin     OTOLARYNGOLOGY REFERRAL        Primary Care Provider Office Phone # Fax #    Amber Gracia [phone]       [facility] [address]        Equal Access to Services     [facility]: Hadii aad ku hadasho Soomaali, waaxda luqadaha, qaybta kaalmada adeegyada, waxvikas [place]de . So [facility] [phone].    ATENCIÓN: Si habla español, tiene a garcia disposición servicios gratuitos de asistencia lingüística. [facility] [phone].    We comply with applicable federal civil rights laws and [place] laws. We do not discriminate on the basis of race, color, national origin, age, disability, sex, sexual orientation, or gender identity.            Thank you!     Thank you for choosing [facility]  for your care. Our goal is always to provide you with excellent care. Hearing back from our patients is one way we can continue to improve our services. Please take a few minutes to complete the written survey that you may receive in the mail after your visit with us. Thank you!             Your Updated Medication List - Protect others around you: Learn how to safely use, store and throw away your medicines at www.disposemymeds.org.          This list is accurate as of 5/1/18  6:47 PM.  Always use your most recent med list.                   Brand Name Dispense Instructions for use Diagnosis    acyclovir 200 MG capsule    ZOVIRAX    25 capsule    Take 1-2 capsules (200-400 mg) by mouth 5 times daily TAKE AS DIRECTED FOR COLD SORES    Recurrent cold sores       ascorbic acid 1000 MG Tabs    vitamin C     Take 1,000 mg by mouth daily.        CALCIUM 1200 PO      Take  by  mouth daily.        fish oil-omega-3 fatty acids 1000 MG capsule      ONCE DAILY        MAGNESIUM PO      Take  by mouth.        metroNIDAZOLE 0.75 % topical gel    METROGEL    30 g    Apply topically daily as needed.    Vaginal irritation       Multi-vitamin Tabs tablet      Take 1 tablet by mouth daily.        order for DME     30 g    Profile Rx: patient will contact pharmacy when needed   Hydrocortisone 2.5% cream and then added 0.3 grams of Clotrimazole Powder to make it 1%. Previous pharmacy:  [facility] and the pharmacist said that they used 30 grams    Candidal intertrigo       VITAMIN D-3 PO      Take 4,000 mg by mouth.

## 2018-05-02 ASSESSMENT — ANXIETY QUESTIONNAIRES: GAD7 TOTAL SCORE: 2

## 2018-05-02 ASSESSMENT — PATIENT HEALTH QUESTIONNAIRE - PHQ9: SUM OF ALL RESPONSES TO PHQ QUESTIONS 1-9: 2

## 2018-05-02 NOTE — PROGRESS NOTES
Dear Shayne,    Your recent test results are attached.      Your test results are normal.    If you have any questions please feel free to contact [phone] or myself via [employer].    Sincerely,  Sol Anderson, CNP

## 2018-05-04 ENCOUNTER — ANESTHESIA EVENT (OUTPATIENT)
Dept: SURGERY | Facility: AMBULATORY SURGERY CENTER | Age: 63
End: 2018-05-04

## 2018-05-07 ENCOUNTER — SURGERY (OUTPATIENT)
Age: 63
End: 2018-05-07

## 2018-05-07 ENCOUNTER — HOSPITAL ENCOUNTER (OUTPATIENT)
Facility: AMBULATORY SURGERY CENTER | Age: 63
Discharge: HOME | End: 2018-05-07
Attending: UROLOGY | Admitting: UROLOGY
Payer: COMMERCIAL

## 2018-05-07 ENCOUNTER — ANESTHESIA (OUTPATIENT)
Dept: SURGERY | Facility: AMBULATORY SURGERY CENTER | Age: 63
End: 2018-05-07
Payer: COMMERCIAL

## 2018-05-07 VITALS
SYSTOLIC BLOOD PRESSURE: 107 MMHG | DIASTOLIC BLOOD PRESSURE: 62 MMHG | RESPIRATION RATE: 16 BRPM | TEMPERATURE: 97.7 F | OXYGEN SATURATION: 100 %

## 2018-05-07 DIAGNOSIS — N39.3 SUI (STRESS URINARY INCONTINENCE, FEMALE): Primary | ICD-10-CM

## 2018-05-07 PROCEDURE — G8907 PT DOC NO EVENTS ON DISCHARG: HCPCS

## 2018-05-07 PROCEDURE — 57288 REPAIR BLADDER DEFECT: CPT

## 2018-05-07 PROCEDURE — G8916 PT W IV AB GIVEN ON TIME: HCPCS

## 2018-05-07 PROCEDURE — C1771 REP DEV, URINARY, W/SLING: HCPCS

## 2018-05-07 PROCEDURE — 57288 REPAIR BLADDER DEFECT: CPT | Performed by: UROLOGY

## 2018-05-07 DEVICE — MESH SLING SINGLE INCISION ALTIS 519650: Type: IMPLANTABLE DEVICE | Site: URETHRA | Status: FUNCTIONAL

## 2018-05-07 RX ORDER — LIDOCAINE HYDROCHLORIDE 20 MG/ML
INJECTION, SOLUTION INFILTRATION; PERINEURAL PRN
Status: DISCONTINUED | OUTPATIENT
Start: 2018-05-07 | End: 2018-05-07

## 2018-05-07 RX ORDER — CEFAZOLIN SODIUM 1 G/3ML
1 INJECTION, POWDER, FOR SOLUTION INTRAMUSCULAR; INTRAVENOUS SEE ADMIN INSTRUCTIONS
Status: DISCONTINUED | OUTPATIENT
Start: 2018-05-07 | End: 2018-05-08 | Stop reason: HOSPADM

## 2018-05-07 RX ORDER — FENTANYL CITRATE 50 UG/ML
25-50 INJECTION, SOLUTION INTRAMUSCULAR; INTRAVENOUS
Status: DISCONTINUED | OUTPATIENT
Start: 2018-05-07 | End: 2018-05-08 | Stop reason: HOSPADM

## 2018-05-07 RX ORDER — HYDROCODONE BITARTRATE AND ACETAMINOPHEN 5; 325 MG/1; MG/1
1 TABLET ORAL ONCE
Status: DISCONTINUED | OUTPATIENT
Start: 2018-05-07 | End: 2018-05-08 | Stop reason: HOSPADM

## 2018-05-07 RX ORDER — ACETAMINOPHEN 325 MG/1
975 TABLET ORAL ONCE
Status: COMPLETED | OUTPATIENT
Start: 2018-05-07 | End: 2018-05-07

## 2018-05-07 RX ORDER — SODIUM CHLORIDE, SODIUM LACTATE, POTASSIUM CHLORIDE, CALCIUM CHLORIDE 600; 310; 30; 20 MG/100ML; MG/100ML; MG/100ML; MG/100ML
INJECTION, SOLUTION INTRAVENOUS CONTINUOUS
Status: DISCONTINUED | OUTPATIENT
Start: 2018-05-07 | End: 2018-05-08 | Stop reason: HOSPADM

## 2018-05-07 RX ORDER — ONDANSETRON 4 MG/1
4 TABLET, ORALLY DISINTEGRATING ORAL EVERY 30 MIN PRN
Status: DISCONTINUED | OUTPATIENT
Start: 2018-05-07 | End: 2018-05-08 | Stop reason: HOSPADM

## 2018-05-07 RX ORDER — NALOXONE HYDROCHLORIDE 0.4 MG/ML
.1-.4 INJECTION, SOLUTION INTRAMUSCULAR; INTRAVENOUS; SUBCUTANEOUS
Status: DISCONTINUED | OUTPATIENT
Start: 2018-05-07 | End: 2018-05-08 | Stop reason: HOSPADM

## 2018-05-07 RX ORDER — MEPERIDINE HYDROCHLORIDE 25 MG/ML
12.5 INJECTION INTRAMUSCULAR; INTRAVENOUS; SUBCUTANEOUS
Status: DISCONTINUED | OUTPATIENT
Start: 2018-05-07 | End: 2018-05-08 | Stop reason: HOSPADM

## 2018-05-07 RX ORDER — HYDROMORPHONE HYDROCHLORIDE 1 MG/ML
.3-.5 INJECTION, SOLUTION INTRAMUSCULAR; INTRAVENOUS; SUBCUTANEOUS EVERY 10 MIN PRN
Status: DISCONTINUED | OUTPATIENT
Start: 2018-05-07 | End: 2018-05-08 | Stop reason: HOSPADM

## 2018-05-07 RX ORDER — ONDANSETRON 2 MG/ML
INJECTION INTRAMUSCULAR; INTRAVENOUS PRN
Status: DISCONTINUED | OUTPATIENT
Start: 2018-05-07 | End: 2018-05-07

## 2018-05-07 RX ORDER — CEFAZOLIN SODIUM 2 G/100ML
2 INJECTION, SOLUTION INTRAVENOUS
Status: COMPLETED | OUTPATIENT
Start: 2018-05-07 | End: 2018-05-07

## 2018-05-07 RX ORDER — ONDANSETRON 2 MG/ML
4 INJECTION INTRAMUSCULAR; INTRAVENOUS EVERY 30 MIN PRN
Status: DISCONTINUED | OUTPATIENT
Start: 2018-05-07 | End: 2018-05-08 | Stop reason: HOSPADM

## 2018-05-07 RX ORDER — LIDOCAINE 40 MG/G
CREAM TOPICAL
Status: DISCONTINUED | OUTPATIENT
Start: 2018-05-07 | End: 2018-05-08 | Stop reason: HOSPADM

## 2018-05-07 RX ORDER — FENTANYL CITRATE 50 UG/ML
INJECTION, SOLUTION INTRAMUSCULAR; INTRAVENOUS PRN
Status: DISCONTINUED | OUTPATIENT
Start: 2018-05-07 | End: 2018-05-07

## 2018-05-07 RX ORDER — GABAPENTIN 300 MG/1
300 CAPSULE ORAL ONCE
Status: COMPLETED | OUTPATIENT
Start: 2018-05-07 | End: 2018-05-07

## 2018-05-07 RX ORDER — HYDROCODONE BITARTRATE AND ACETAMINOPHEN 5; 325 MG/1; MG/1
1-2 TABLET ORAL EVERY 4 HOURS PRN
Qty: 5 TABLET | Refills: 0 | Status: SHIPPED | OUTPATIENT
Start: 2018-05-07 | End: 2018-09-12

## 2018-05-07 RX ORDER — OXYCODONE HYDROCHLORIDE 5 MG/1
5-10 TABLET ORAL EVERY 4 HOURS PRN
Status: DISCONTINUED | OUTPATIENT
Start: 2018-05-07 | End: 2018-05-08 | Stop reason: HOSPADM

## 2018-05-07 RX ORDER — PROPOFOL 10 MG/ML
INJECTION, EMULSION INTRAVENOUS CONTINUOUS PRN
Status: DISCONTINUED | OUTPATIENT
Start: 2018-05-07 | End: 2018-05-07

## 2018-05-07 RX ORDER — PROPOFOL 10 MG/ML
INJECTION, EMULSION INTRAVENOUS PRN
Status: DISCONTINUED | OUTPATIENT
Start: 2018-05-07 | End: 2018-05-07

## 2018-05-07 RX ORDER — DEXAMETHASONE SODIUM PHOSPHATE 4 MG/ML
INJECTION, SOLUTION INTRA-ARTICULAR; INTRALESIONAL; INTRAMUSCULAR; INTRAVENOUS; SOFT TISSUE PRN
Status: DISCONTINUED | OUTPATIENT
Start: 2018-05-07 | End: 2018-05-07

## 2018-05-07 RX ORDER — SODIUM CHLORIDE 9 MG/ML
INJECTION INTRAMUSCULAR; INTRAVENOUS; SUBCUTANEOUS PRN
Status: DISCONTINUED | OUTPATIENT
Start: 2018-05-07 | End: 2018-05-07 | Stop reason: HOSPADM

## 2018-05-07 RX ADMIN — ACETAMINOPHEN 975 MG: 325 TABLET ORAL at 07:14

## 2018-05-07 RX ADMIN — CEFAZOLIN SODIUM 2 G: 2 INJECTION, SOLUTION INTRAVENOUS at 07:33

## 2018-05-07 RX ADMIN — FENTANYL CITRATE 50 MCG: 50 INJECTION, SOLUTION INTRAMUSCULAR; INTRAVENOUS at 07:45

## 2018-05-07 RX ADMIN — PROPOFOL 30 MG: 10 INJECTION, EMULSION INTRAVENOUS at 07:41

## 2018-05-07 RX ADMIN — FENTANYL CITRATE 50 MCG: 50 INJECTION, SOLUTION INTRAMUSCULAR; INTRAVENOUS at 07:40

## 2018-05-07 RX ADMIN — SODIUM CHLORIDE 10 ML: 9 INJECTION INTRAMUSCULAR; INTRAVENOUS; SUBCUTANEOUS at 07:59

## 2018-05-07 RX ADMIN — PROPOFOL 50 MG: 10 INJECTION, EMULSION INTRAVENOUS at 07:54

## 2018-05-07 RX ADMIN — OXYCODONE HYDROCHLORIDE 5 MG: 5 TABLET ORAL at 08:26

## 2018-05-07 RX ADMIN — SODIUM CHLORIDE, SODIUM LACTATE, POTASSIUM CHLORIDE, CALCIUM CHLORIDE: 600; 310; 30; 20 INJECTION, SOLUTION INTRAVENOUS at 07:27

## 2018-05-07 RX ADMIN — PROPOFOL 125 MCG/KG/MIN: 10 INJECTION, EMULSION INTRAVENOUS at 07:41

## 2018-05-07 RX ADMIN — PROPOFOL 40 MG: 10 INJECTION, EMULSION INTRAVENOUS at 07:59

## 2018-05-07 RX ADMIN — GABAPENTIN 300 MG: 300 CAPSULE ORAL at 07:14

## 2018-05-07 RX ADMIN — PROPOFOL 70 MG: 10 INJECTION, EMULSION INTRAVENOUS at 07:40

## 2018-05-07 RX ADMIN — ONDANSETRON 4 MG: 2 INJECTION INTRAMUSCULAR; INTRAVENOUS at 07:44

## 2018-05-07 RX ADMIN — DEXAMETHASONE SODIUM PHOSPHATE 10 MG: 4 INJECTION, SOLUTION INTRA-ARTICULAR; INTRALESIONAL; INTRAMUSCULAR; INTRAVENOUS; SOFT TISSUE at 07:44

## 2018-05-07 RX ADMIN — LIDOCAINE HYDROCHLORIDE 40 MG: 20 INJECTION, SOLUTION INFILTRATION; PERINEURAL at 07:40

## 2018-05-07 NOTE — OP NOTE
Procedure Date: 05/07/2018      PROCEDURE PERFORMED:  Pubovaginal sling (Altis).        PREOPERATIVE DIAGNOSIS:  Stress urinary incontinence with hypermobility.      POSTOPERATIVE DIAGNOSIS:  Stress urinary incontinence with hypermobility.      ANESTHESIA:  Monitored anesthesia care.      SURGEON:  Ras White MD      BLOOD LOSS:  10 mL.      DRAINS:  None.      COMPLICATIONS:  None.      INDICATIONS FOR SURGERY:  This is a 63-year-old white female who has undergone extensive outpatient evaluation for progressive stress urinary incontinence.  The findings on evaluation include moderate to marked hypermobility of the bladder neck and urethra with grade I anterior compartment defect.  Cystoscopic findings include normal bladder mucosa, capacity on the order of 500 mL with moderate to marked hypermobility and moderate to marked leakage on Valsalva maneuver.   In light of these findings, Ms. Douglass is offered pubovaginal sling using the Altis technique.  Informed consent was carefully obtained and documented in the outpatient record.       SURGERY IN DETAIL:  The patient was brought to the operating room and carefully positioned supine.  General endotracheal anesthesia was administered without incident.  Prophylactic antibiotics were administered.  Pneumatic stockings were placed.  She was then carefully positioned in low lithotomy, and a sterile prep and drape was performed.  Surgical timeout was performed per protocol.       The vagina was prepared for surgery with a 20 [place] Benavides catheter and Allen retractor.  Laxity of support at the bladder neck and urethra were again appreciated.  A marking pen was used to delineate the line of the proposed incision over the mid urethra.  The subepithelial tissue was infiltrated with sterile saline.  A #15-blade was used to make a longitudinal incision.  A combination of sharp and blunt dissection was carried back to the ventral portion of the anterior ramus.  This was  directed at approximately 2 and 10 o'clock.       At this point, the Altis sling was carefully loaded onto the right side passing instrument.  The static anchor was then passed into the right obturator membrane at approximately the 10 o'clock position in an external rotational manner.  The introducer was carefully withdrawn in a reverse helical fashion.  Attention was performed to ensure adequate anchoring.  The left side instrument was then carefully secured to the Dynamic anchor, which was carefully freed from the suture with gentle motion.  This anchor was then carefully secured into the patient's left obturator membrane at approximately 2 o'clock, again using external rotation.  At this point, tensioning was performed to ensure that the Altis sling lay appropriately on the urethra with appropriate tension.  Once this was ascertained, the redundant portion of the tensioning suture was transected and removed.  Copious antibiotic irrigation was used.  Meticulous hemostasis was documented.  Closure was performed with a running, locked stitch of 2-0 Vicryl.  A vaginal packing soaked in antibiotic solution was temporarily placed.  The Benavides catheter and retractor were withdrawn.       Cystoscopy was performed under real time video control with the 70-degree optic.  There was no evidence of mesh or suture in the lower urinary tract.  The bladder was drained and the scope was withdrawn.  The patient tolerated the procedure in a satisfactory manner and was brought to the recovery in stable condition.  There were no operative complications.                                        At this point, tensioning was performed to that the Altis sling with Xenia QURESHI MD             D: 2018   T: 2018   MT: AIMEE      Name:     BRYN GALINDO   MRN:      [id number]        Account:        [id number]   :      1955           Procedure Date: 2018      Document: [id number]

## 2018-05-07 NOTE — ANESTHESIA PREPROCEDURE EVALUATION
Anesthesia Evaluation     . Pt has had prior anesthetic. Type: MAC    No history of anesthetic complications          ROS/MED HX    ENT/Pulmonary:  - neg pulmonary ROS     Neurologic:  - neg neurologic ROS     Cardiovascular:     (+) Dyslipidemia, ----. : . . . :. .       METS/Exercise Tolerance:     Hematologic:  - neg hematologic  ROS       Musculoskeletal:  - neg musculoskeletal ROS       GI/Hepatic:     (+) GERD Asymptomatic on medication,       Renal/Genitourinary:  - ROS Renal section negative       Endo:  - neg endo ROS       Psychiatric:  - neg psychiatric ROS       Infectious Disease:  - neg infectious disease ROS       Malignancy:      - no malignancy   Other:    - neg other ROS                 Physical Exam  Normal systems: cardiovascular, pulmonary and dental    Airway   Mallampati: I  TM distance: >3 FB  Neck ROM: full    Dental     Cardiovascular       Pulmonary    breath sounds clear to auscultation                    Anesthesia Plan      History & Physical Review  History and physical reviewed and following examination; no interval change.    ASA Status:  2 .    NPO Status:  > 8 hours    Plan for MAC with Intravenous and Propofol induction. Maintenance will be TIVA.  Reason for MAC:  Deep or markedly invasive procedure (G8)  PONV prophylaxis:  Ondansetron (or other 5HT-3) and Dexamethasone or Solumedrol       Postoperative Care  Postoperative pain management:  Multi-modal analgesia.      Consents  Anesthetic plan, risks, benefits and alternatives discussed with:  Patient and Spouse..                          .

## 2018-05-07 NOTE — ANESTHESIA CARE TRANSFER NOTE
Patient: Shayne Douglass    Procedure(s):  Pubovaginal sling(Altis) - Wound Class: II-Clean Contaminated    Diagnosis: Stress urinary incontinence  Diagnosis Additional Information: No value filed.    Anesthesia Type:   MAC     Note:  Airway :Room Air  Patient transferred to:Phase II  Comments: To Phase II. Report to RN.  VSS Resp status stable.Handoff Report: Identifed the Patient, Identified the Reponsible Provider, Reviewed the pertinent medical history, Discussed the surgical course, Reviewed Intra-OP anesthesia mangement and issues during anesthesia, Set expectations for post-procedure period and Allowed opportunity for questions and acknowledgement of understanding      Vitals: (Last set prior to Anesthesia Care Transfer)    CRNA VITALS  5/7/2018 0745 - 5/7/2018 0819      5/7/2018             Pulse: 72    SpO2: 97 %                Electronically Signed By: HAYLEY Koehler CRNA  May 7, 2018  8:19 AM

## 2018-05-07 NOTE — DISCHARGE INSTRUCTIONS
Activity:  Day of Surgery- Relax  Post-op Day 1- Relax  Post-op day 2- Back to work  May return to the gym in one week  No intimacy for one month  May shower.  No Tub Baths for 1 week.  Pain:  Tylenol as needed.  Narcotic pain medications if needed.    No NSAIDS for 1 week.    [facility]  Same-Day Surgery   Adult Discharge Orders & Instructions   For 24 hours after surgery  1. Get plenty of rest.  A responsible adult must stay with you for at least 24 hours after you leave the hospital.   2. Do not drive or use heavy equipment.  If you have weakness or tingling, don't drive or use heavy equipment until this feeling goes away.  3. Do not drink alcohol.  4. Avoid strenuous or risky activities.  Ask for help when climbing stairs.   5. You may feel lightheaded.  IF so, sit for a few minutes before standing.  Have someone help you get up.   6. If you have nausea (feel sick to your stomach): Drink only clear liquids such as apple juice, ginger ale, broth or 7-Up.  Rest may also help.  Be sure to drink enough fluids.  Move to a regular diet as you feel able.  7. You may have a slight fever. Call the doctor if your fever is over 100 F (37.7 C) (taken under the tongue) or lasts longer than 24 hours.  8. You may have a dry mouth, a sore throat, muscle aches or trouble sleeping.  These should go away after 24 hours.  9. Do not make important or legal decisions.   Call your doctor for any of the followin.  Signs of infection (fever, growing tenderness at the surgery site, a large amount of drainage or bleeding, severe pain, foul-smelling drainage, redness, swelling).    2. It has been over 8 to 10 hours since surgery and you are still not able to urinate (pass water).    3.  Headache for over 24 hours.  To contact Dr White, please call [phone].

## 2018-05-07 NOTE — OR NURSING
Pt was able to void.  Bladder scan done after voiding and residual volume was 31.  Pt discharged to home.

## 2018-05-07 NOTE — IP AVS SNAPSHOT
MRN:[id number]                      After Visit Summary   5/7/2018    Shayne Douglass    MRN: [id number]           Thank you!     Thank you for choosing [facility] for your care. Our goal is always to provide you with excellent care. Hearing back from our patients is one way we can continue to improve our services. Please take a few minutes to complete the written survey that you may receive in the mail after you visit with us. Thank you!        Patient Information     Date Of Birth          1955        About your hospital stay     You were admitted on:  May 7, 2018 You last received care in the:  [facility]    You were discharged on:  May 7, 2018       Who to Call     For medical emergencies, please call 911.  For non-urgent questions about your medical care, please call your primary care provider or clinic, [phone]  For questions related to your surgery, please call your surgery clinic        Attending Provider     Provider Specialty    Ras White MD Urology       Primary Care Provider Office Phone # Fax #    Sol Reyes HAYLEY [facility] [phone]      Your next 10 appointments already scheduled     Jun 07, 2018  9:00 AM CDT   Return Visit with Ras White MD   [facility] ([address] [address]   [phone]              Further instructions from your care team       Activity:  Day of Surgery- Relax  Post-op Day 1- Relax  Post-op day 2- Back to work  May return to the gym in one week  No intimacy for one month  May shower.  No Tub Baths for 1 week.  Pain:  Tylenol as needed.  Narcotic pain medications if needed.    No NSAIDS for 1 week.    [facility] [place]  Same-Day Surgery   Adult Discharge Orders & Instructions   For 24 hours after surgery  1. Get plenty of rest.  A responsible adult must stay with you for at least 24 hours after you leave the  "[facility].   2. Do not drive or use heavy equipment.  If you have weakness or tingling, don't drive or use heavy equipment until this feeling goes away.  3. Do not drink alcohol.  4. Avoid strenuous or risky activities.  Ask for help when climbing stairs.   5. You may feel lightheaded.  IF so, sit for a few minutes before standing.  Have someone help you get up.   6. If you have nausea (feel sick to your stomach): Drink only clear liquids such as apple juice, ginger ale, broth or 7-Up.  Rest may also help.  Be sure to drink enough fluids.  Move to a regular diet as you feel able.  7. You may have a slight fever. Call the doctor if your fever is over 100 F (37.7 C) (taken under the tongue) or lasts longer than 24 hours.  8. You may have a dry mouth, a sore throat, muscle aches or trouble sleeping.  These should go away after 24 hours.  9. Do not make important or legal decisions.   Call your doctor for any of the followin.  Signs of infection (fever, growing tenderness at the surgery site, a large amount of drainage or bleeding, severe pain, foul-smelling drainage, redness, swelling).    2. It has been over 8 to 10 hours since surgery and you are still not able to urinate (pass water).    3.  Headache for over 24 hours.  To contact Dr White, please call [phone].          Pending Results     No orders found from 2018 to 2018.            Admission Information     Date & Time Provider Department Dept. Phone    2018 Ras White MD [facility] [phone]      Your Vitals Were     Blood Pressure Temperature Respirations Pulse Oximetry          109/54 97.7  F (36.5  C) (Temporal) 16 100%        MyChart Information     ebridge lets you send messages to your doctor, view your test results, renew your prescriptions, schedule appointments and more. To sign up, go to [email or web address] . Click on \"Log in\" on the left side of the screen, which will take you to the Welcome " "page. Then click on \"Sign up Now\" on the right side of the page.     You will be asked to enter the access code listed below, as well as some personal information. Please follow the directions to create your username and password.     Your access code is: [email or web address]  Expires: 2018  1:44 PM     Your access code will  in 90 days. If you need help or a new code, please call your [place] clinic or [phone].        Care EveryWhere ID     This is your Care EveryWhere ID. This could be used by other organizations to access your [place] medical records  [id number]        Equal Access to Services     TOMASA HOLT : Maurice Cook, ivory wilson, teagan colon, soy vasquez. So [facility] [phone].    ATENCIÓN: Si habla español, tiene a garcia disposición servicios gratuitos de asistencia lingüística. Llame al [phone].    We comply with applicable federal civil rights laws and [place] laws. We do not discriminate on the basis of race, color, national origin, age, disability, sex, sexual orientation, or gender identity.               Review of your medicines      UNREVIEWED medicines. Ask your doctor about these medicines        Dose / Directions    acyclovir 200 MG capsule   Commonly known as:  ZOVIRAX   Used for:  Recurrent cold sores        Dose:  200-400 mg   Take 1-2 capsules (200-400 mg) by mouth 5 times daily TAKE AS DIRECTED FOR COLD SORES   Quantity:  25 capsule   Refills:  1       ascorbic acid 1000 MG Tabs   Commonly known as:  vitamin C        Dose:  1000 mg   Take 1,000 mg by mouth daily.   Refills:  0       CALCIUM 1200 PO        Take  by mouth daily.   Refills:  0       fish oil-omega-3 fatty acids 1000 MG capsule        ONCE DAILY   Refills:  0       MAGNESIUM PO        Take  by mouth.   Refills:  0       metroNIDAZOLE 0.75 % topical gel   Commonly known as:  METROGEL   Used for:  Vaginal irritation        Apply topically daily as " needed.   Quantity:  30 g   Refills:  0       Multi-vitamin Tabs tablet        Dose:  1 tablet   Take 1 tablet by mouth daily.   Refills:  0       VITAMIN D-3 PO        Dose:  4000 mg   Take 4,000 mg by mouth.   Refills:  0         START taking        Dose / Directions    HYDROcodone-acetaminophen 5-325 MG per tablet   Commonly known as:  NORCO   Used for:  WILLIAMS (stress urinary incontinence, female)        Dose:  1-2 tablet   Take 1-2 tablets by mouth every 4 hours as needed for severe pain (Moderate to Severe Pain)   Quantity:  5 tablet   Refills:  0         CONTINUE these medicines which have NOT CHANGED        Dose / Directions    order for DME   Used for:  Candidal intertrigo        Profile Rx: patient will contact pharmacy when needed   Hydrocortisone 2.5% cream and then added 0.3 grams of Clotrimazole Powder to make it 1%. Previous pharmacy:  [facility] and the pharmacist said that they used 30 grams   Quantity:  30 g   Refills:  0            Where to get your medicines      Some of these will need a paper prescription and others can be bought over the counter. Ask your nurse if you have questions.     Bring a paper prescription for each of these medications     HYDROcodone-acetaminophen 5-325 MG per tablet                Protect others around you: Learn how to safely use, store and throw away your medicines at www.disposemymeds.org.        Information about OPIOIDS     PRESCRIPTION OPIOIDS: WHAT YOU NEED TO KNOW   You have a prescription for an opioid (narcotic) pain medicine. Opioids can cause addiction. If you have a history of chemical dependency of any type, you are at a higher risk of becoming addicted to opioids. Only take this medicine after all other options have been tried. Take it for as short a time and as few doses as possible.     Do not:    Drive. If you drive while taking these medicines, you could be arrested for driving under the influence (DUI).    Operate heavy machinery    Do  any other dangerous activities while taking these medicines.     Drink any alcohol while taking these medicines.      Take with any other medicines that contain acetaminophen. Read all labels carefully. Look for the word  acetaminophen  or  Tylenol.  Ask your pharmacist if you have questions or are unsure.    Store your pills in a secure place, locked if possible. We will not replace any lost or stolen medicine. If you don t finish your medicine, please throw away (dispose) as directed by your pharmacist. The [place] Pollution Control Agency has more information about safe disposal: https://www.pca.[facility].us/living-green/managing-unwanted-medications    All opioids tend to cause constipation. Drink plenty of water and eat foods that have a lot of fiber, such as fruits, vegetables, prune juice, apple juice and high-fiber cereal. Take a laxative (Miralax, milk of magnesia, Colace, Senna) if you don t move your bowels at least every other day.              Medication List: This is a list of all your medications and when to take them. Check marks below indicate your daily home schedule. Keep this list as a reference.      Medications           Morning Afternoon Evening Bedtime As Needed    acyclovir 200 MG capsule   Commonly known as:  ZOVIRAX   Take 1-2 capsules (200-400 mg) by mouth 5 times daily TAKE AS DIRECTED FOR COLD SORES                                ascorbic acid 1000 MG Tabs   Commonly known as:  vitamin C   Take 1,000 mg by mouth daily.                                CALCIUM 1200 PO   Take  by mouth daily.                                fish oil-omega-3 fatty acids 1000 MG capsule   ONCE DAILY                                HYDROcodone-acetaminophen 5-325 MG per tablet   Commonly known as:  NORCO   Take 1-2 tablets by mouth every 4 hours as needed for severe pain (Moderate to Severe Pain)                                MAGNESIUM PO   Take  by mouth.                                metroNIDAZOLE 0.75 %  topical gel   Commonly known as:  METROGEL   Apply topically daily as needed.                                Multi-vitamin Tabs tablet   Take 1 tablet by mouth daily.                                order for DME   Profile Rx: patient will contact pharmacy when needed   Hydrocortisone 2.5% cream and then added 0.3 grams of Clotrimazole Powder to make it 1%. Previous pharmacy:  [facility] and the pharmacist said that they used 30 grams                                VITAMIN D-3 PO   Take 4,000 mg by mouth.

## 2018-05-07 NOTE — IP AVS SNAPSHOT
[facility]    [address] [place] [address]    Phone:  [phone]                                       After Visit Summary   5/7/2018    Shayne Douglass    MRN: [id number]           After Visit Summary Signature Page     I have received my discharge instructions, and my questions have been answered. I have discussed any challenges I see with this plan with the nurse or doctor.    ..........................................................................................................................................  Patient/Patient Representative Signature      ..........................................................................................................................................  Patient Representative Print Name and Relationship to Patient    ..................................................               ................................................  Date                                            Time    ..........................................................................................................................................  Reviewed by Signature/Title    ...................................................              ..............................................  Date                                                            Time

## 2018-05-07 NOTE — ANESTHESIA POSTPROCEDURE EVALUATION
Patient: Shayne Douglass    Procedure(s):  Pubovaginal sling(Altis) - Wound Class: II-Clean Contaminated    Diagnosis:Stress urinary incontinence  Diagnosis Additional Information: No value filed.    Anesthesia Type:  MAC    Note:  Anesthesia Post Evaluation    Patient location during evaluation: PACU  Patient participation: Able to fully participate in evaluation  Level of consciousness: awake  Pain management: adequate  Airway patency: patent  Cardiovascular status: acceptable  Respiratory status: acceptable  Hydration status: acceptable  PONV: none     Anesthetic complications: None    Comments: Awake and alert.  Excellent recovery .        Last vitals:  Vitals:    05/07/18 0820 05/07/18 0830 05/07/18 0845   BP: 129/67 119/69 96/54   Resp: 16 16 16   Temp: 97.7  F (36.5  C)     SpO2: 96% 96% 98%         Electronically Signed By: Yaya Ramon MD  May 7, 2018  9:09 AM

## 2018-06-07 ENCOUNTER — OFFICE VISIT (OUTPATIENT)
Dept: UROLOGY | Facility: CLINIC | Age: 63
End: 2018-06-07
Payer: COMMERCIAL

## 2018-06-07 VITALS — OXYGEN SATURATION: 96 % | SYSTOLIC BLOOD PRESSURE: 130 MMHG | HEART RATE: 77 BPM | DIASTOLIC BLOOD PRESSURE: 84 MMHG

## 2018-06-07 DIAGNOSIS — N39.3 SUI (STRESS URINARY INCONTINENCE, FEMALE): Primary | ICD-10-CM

## 2018-06-07 LAB
ALBUMIN UR-MCNC: NEGATIVE MG/DL
APPEARANCE UR: CLEAR
BILIRUB UR QL STRIP: NEGATIVE
COLOR UR AUTO: YELLOW
GLUCOSE UR STRIP-MCNC: NEGATIVE MG/DL
HGB UR QL STRIP: ABNORMAL
KETONES UR STRIP-MCNC: NEGATIVE MG/DL
LEUKOCYTE ESTERASE UR QL STRIP: NEGATIVE
NITRATE UR QL: NEGATIVE
PH UR STRIP: 6.5 PH (ref 5–7)
RBC #/AREA URNS AUTO: NORMAL /HPF
SOURCE: ABNORMAL
SP GR UR STRIP: 1.02 (ref 1–1.03)
UROBILINOGEN UR STRIP-ACNC: 0.2 EU/DL (ref 0.2–1)
WBC #/AREA URNS AUTO: NORMAL /HPF

## 2018-06-07 PROCEDURE — 81001 URINALYSIS AUTO W/SCOPE: CPT | Performed by: UROLOGY

## 2018-06-07 PROCEDURE — 99024 POSTOP FOLLOW-UP VISIT: CPT | Performed by: UROLOGY

## 2018-06-07 NOTE — PROGRESS NOTES
"S/p Altis sling 5/7/18    Still some leak with sneeze and vigorous dancing, usually occurs if she has a \"couple of drinks\" prior to dancing. Does not wear a pad day or nite for routine activities.    Denies dysuria, gross hematuria, frequency; nocturia x 1.      PE: Excellent support, non-tender. Scant leak with the heaviest of cough      Current Outpatient Prescriptions   Medication     ascorbic acid (VITAMIN C) 1000 MG TABS     Calcium Carbonate-Vit D-Min (CALCIUM 1200 PO)     Cholecalciferol (VITAMIN D-3 PO)     fish oil-omega-3 fatty acids (FISH OIL) 1000 MG capsule     HYDROcodone-acetaminophen (NORCO) 5-325 MG per tablet     MAGNESIUM PO     multivitamin, therapeutic with minerals (MULTI-VITAMIN) TABS     acyclovir (ZOVIRAX) 200 MG capsule     metroNIDAZOLE (METROGEL) 0.75 % topical gel     order for DME     [DISCONTINUED] ORDER FOR DME     No current facility-administered medications for this visit.          Results for orders placed or performed in visit on 06/07/18   UA reflex to Microscopic   Result Value Ref Range    Color Urine Yellow     Appearance Urine Clear     Glucose Urine Negative NEG^Negative mg/dL    Bilirubin Urine Negative NEG^Negative    Ketones Urine Negative NEG^Negative mg/dL    Specific Gravity Urine 1.020 1.003 - 1.035    Blood Urine Trace (A) NEG^Negative    pH Urine 6.5 5.0 - 7.0 pH    Protein Albumin Urine Negative NEG^Negative mg/dL    Urobilinogen Urine 0.2 0.2 - 1.0 EU/dL    Nitrite Urine Negative NEG^Negative    Leukocyte Esterase Urine Negative NEG^Negative    Source Midstream Urine        IMP:  1. MIld residual WILLIAMS one month post-sling      PLAN:  1. Allow for additional healing  2. RTC 3 mos to review progress; might consider Durasphere at that point; introduced concept today  3. Copy    "

## 2018-06-07 NOTE — MR AVS SNAPSHOT
After Visit Summary   6/7/2018    Shayne Douglass    MRN: [id number]           Patient Information     Date Of Birth          1955        Visit Information        Provider Department      6/7/2018 9:00 AM Ras White MD [facility]        Today's Diagnoses     WILLIAMS (stress urinary incontinence, female)    -  1       Follow-ups after your visit        Your next 10 appointments already scheduled     Sep 12, 2018  7:30 AM CDT   Return Visit with Ras White MD   [facility] ([address]  [place] [address]   [phone]              Who to contact     If you have questions or need follow up information about today's clinic visit or your schedule please contact [facility] directly at [phone].  Normal or non-critical lab and imaging results will be communicated to you by MyChart, letter or phone within 4 business days after the clinic has received the results. If you do not hear from us within 7 days, please contact the clinic through MyChart or phone. If you have a critical or abnormal lab result, we will notify you by phone as soon as possible.  Submit refill requests through [employer] or call your pharmacy and they will forward the refill request to us. Please allow 3 business days for your refill to be completed.          Additional Information About Your Visit        Care EveryWhere ID     This is your Care EveryWhere ID. This could be used by other organizations to access your [place] medical records  [id number]        Your Vitals Were     Pulse Pulse Oximetry                77 96%           Blood Pressure from Last 3 Encounters:   06/07/18 130/84   05/07/18 107/62   05/01/18 130/82    Weight from Last 3 Encounters:   05/01/18 79.5 kg (175 lb 3.2 oz)   02/28/18 78 kg (172 lb)   06/23/16 75.8 kg (167 lb)              We Performed the Following     UA reflex to Microscopic     Urine  [facility]        Primary Care Provider Office Phone # Fax #    Sol Anderson, HAYLEY [facility] [phone] [address] [address]        Equal Access to Services     MILAN HOLT : Maurice lin bobbyo Soangeali, waaxda luqadaha, qaybta kaalmada adeegyada, soy matuten li herndon saima vasquez. So [facility] [phone].    ATENCIÓN: Si habla español, tiene a garcia disposición servicios gratuitos de asistencia lingüística. Llame al [phone].    We comply with applicable federal civil rights laws and [place] laws. We do not discriminate on the basis of race, color, national origin, age, disability, sex, sexual orientation, or gender identity.            Thank you!     Thank you for choosing [facility]  for your care. Our goal is always to provide you with excellent care. Hearing back from our patients is one way we can continue to improve our services. Please take a few minutes to complete the written survey that you may receive in the mail after your visit with us. Thank you!             Your Updated Medication List - Protect others around you: Learn how to safely use, store and throw away your medicines at www.disposemymeds.org.          This list is accurate as of 6/7/18  9:23 AM.  Always use your most recent med list.                   Brand Name Dispense Instructions for use Diagnosis    acyclovir 200 MG capsule    ZOVIRAX    25 capsule    Take 1-2 capsules (200-400 mg) by mouth 5 times daily TAKE AS DIRECTED FOR COLD SORES    Recurrent cold sores       ascorbic acid 1000 MG Tabs    vitamin C     Take 1,000 mg by mouth daily.        CALCIUM 1200 PO      Take  by mouth daily.        fish oil-omega-3 fatty acids 1000 MG capsule      ONCE DAILY        HYDROcodone-acetaminophen 5-325 MG per tablet    NORCO    5 tablet    Take 1-2 tablets by mouth every 4 hours as needed for severe pain (Moderate to Severe Pain)    WILLIAMS (stress urinary incontinence, female)        MAGNESIUM PO      Take  by mouth.        metroNIDAZOLE 0.75 % topical gel    METROGEL    30 g    Apply topically daily as needed.    Vaginal irritation       Multi-vitamin Tabs tablet      Take 1 tablet by mouth daily.        order for DME     30 g    Profile Rx: patient will contact pharmacy when needed   Hydrocortisone 2.5% cream and then added 0.3 grams of Clotrimazole Powder to make it 1%. Previous pharmacy:  [facility] and the pharmacist said that they used 30 grams    Candidal intertrigo       VITAMIN D-3 PO      Take 4,000 mg by mouth.

## 2018-06-16 ENCOUNTER — HEALTH MAINTENANCE LETTER (OUTPATIENT)
Age: 63
End: 2018-06-16

## 2018-07-05 ENCOUNTER — OFFICE VISIT (OUTPATIENT)
Dept: OTOLARYNGOLOGY | Facility: CLINIC | Age: 63
End: 2018-07-05
Payer: COMMERCIAL

## 2018-07-05 VITALS
RESPIRATION RATE: 16 BRPM | SYSTOLIC BLOOD PRESSURE: 127 MMHG | HEIGHT: 66 IN | HEART RATE: 72 BPM | WEIGHT: 176 LBS | OXYGEN SATURATION: 96 % | TEMPERATURE: 97.7 F | DIASTOLIC BLOOD PRESSURE: 79 MMHG | BODY MASS INDEX: 28.28 KG/M2

## 2018-07-05 DIAGNOSIS — J34.89 INTERNAL NASAL LESION: Primary | ICD-10-CM

## 2018-07-05 PROCEDURE — 99213 OFFICE O/P EST LOW 20 MIN: CPT | Mod: 24 | Performed by: OTOLARYNGOLOGY

## 2018-07-05 NOTE — LETTER
7/5/2018         RE: Shayne Douglass  [address]  [place] [address]        Dear Colleague,    Thank you for referring your patient, Shayne Douglass, to the [facility]. Please see a copy of my visit note below.    History of Present Illness - Shayne Douglass is a 63 year old female who presents today with concern of an internal nasal lesion. She saw Dr. Alvarado in 2016 for concerns of left sensorineural hearing loss. She feels that the right nostril tends to scab up. She also feels that the right side of the nose does not breathe as well as the left. She denies nasal trauma or nasal surgery. She does also snore, but she thinks this might be related to alcohol consumption.    Past Medical History -   Patient Active Problem List   Diagnosis     Family hx of colon cancer     Recurrent cold sores     Vitamin D deficiency     Alcohol problem drinking     Abnormal Pap smear of cervix     Bunion     Hyperlipidemia LDL goal <160     Advanced directives, counseling/discussion     Localized osteoarthrosis, shoulder region     Scapular dyskinesis     Impingement syndrome, shoulder     Pain in joint, shoulder region     Family history of breast cancer in sister     Chronic low back pain with sciatica, sciatica laterality unspecified, unspecified back pain laterality       Current Medications -   Current Outpatient Prescriptions:      ascorbic acid (VITAMIN C) 1000 MG TABS, Take 1,000 mg by mouth daily., Disp: , Rfl:      Calcium Carbonate-Vit D-Min (CALCIUM 1200 PO), Take  by mouth daily., Disp: , Rfl:      Cholecalciferol (VITAMIN D-3 PO), Take 4,000 mg by mouth., Disp: , Rfl:      fish oil-omega-3 fatty acids (FISH OIL) 1000 MG capsule, ONCE DAILY, Disp: , Rfl:      multivitamin, therapeutic with minerals (MULTI-VITAMIN) TABS, Take 1 tablet by mouth daily., Disp: , Rfl:      acyclovir (ZOVIRAX) 200 MG capsule, Take 1-2 capsules (200-400 mg) by mouth 5 times daily TAKE AS DIRECTED FOR COLD  SORES (Patient not taking: Reported on 5/1/2018), Disp: 25 capsule, Rfl: 1     HYDROcodone-acetaminophen (NORCO) 5-325 MG per tablet, Take 1-2 tablets by mouth every 4 hours as needed for severe pain (Moderate to Severe Pain) (Patient not taking: Reported on 7/5/2018), Disp: 5 tablet, Rfl: 0     MAGNESIUM PO, Take  by mouth., Disp: , Rfl:      metroNIDAZOLE (METROGEL) 0.75 % topical gel, Apply topically daily as needed. (Patient not taking: Reported on 5/1/2018), Disp: 30 g, Rfl: 0     order for DME, Profile Rx: patient will contact pharmacy when needed   Hydrocortisone 2.5% cream and then added 0.3 grams of Clotrimazole Powder to make it 1%. Previous pharmacy:  [facility] and the pharmacist said that they used 30 grams (Patient not taking: Reported on 5/1/2018), Disp: 30 g, Rfl: 0     [DISCONTINUED] ORDER FOR DME, Profile Rx: patient will contact pharmacy when needed   Hydrocortisone 2.5% cream and then added 0.3 grams of Clotrimazole Powder to make it 1%. Previous pharmacy:  [facility] and the pharmacist said that they used 30 grams, Disp: 30 g, Rfl: 3    Allergies - No Known Allergies    Social History -   Social History     Social History     Marital status:      Spouse name: N/A     Number of children: N/A     Years of education: N/A     Social History Main Topics     Smoking status: Never Smoker     Smokeless tobacco: Never Used     Alcohol use Yes      Comment: 20 drinks per week     Drug use: No     Sexual activity: Yes     Partners: Male     Other Topics Concern     Parent/Sibling W/ Cabg, Mi Or Angioplasty Before 65f 55m? No     Social History Narrative    .    3 adult children               Family History -   Family History   Problem Relation Age of Onset     Diabetes Father      Cardiovascular Father      COPD     Cancer - colorectal Mother      Breast Cancer Sister      Ductal Sarcoma- stage 4     Hypertension No family hx of        Review of Systems - As per HPI  "and PMHx, otherwise 7 system review of the head and neck negative. 10+ system review negative.    Exam:  /79 (Cuff Size: Adult Regular)  Pulse 72  Temp 97.7  F (36.5  C) (Oral)  Resp 16  Ht 1.676 m (5' 6\")  Wt 79.8 kg (176 lb)  SpO2 96%  BMI 28.41 kg/m2  General - The patient is well nourished and well developed, and appears to have good nutritional status.  Alert and oriented to person and place, answers questions and cooperates with examination appropriately.   Head and Face - Normocephalic and atraumatic, with no gross asymmetry noted of the contour of the facial features.  The facial nerve is intact, with strong symmetric movements.  Eyes - Extraocular movements intact.  Sclera were not icteric or injected, conjunctiva were pink and moist.  Nose - examination of the nose demonstrates right septal deviation with mild reduction in nasal airway. Right nasal vestibule with normal appearing skin and vibrissae.      A/P - Shayne Douglass is a 63 year old female with right septal deviation. I discussed that this is likely causing the asymmetric nasal airway restriction. I offered that septoplasty could be helpful, but she did not seem particularly interested in surgery for that problem, but mainly wanted to be sure there was not some growth in the nose causing her symptoms. I reassured her that there was not, and advised trying [place] Gel to the affected area to help keep down crusting.      Dr. Ameena Mata MD  Otolaryngology  [facility]      Again, thank you for allowing me to participate in the care of your patient.        Sincerely,        Ameena Mata MD    "

## 2018-09-12 ENCOUNTER — OFFICE VISIT (OUTPATIENT)
Dept: UROLOGY | Facility: CLINIC | Age: 63
End: 2018-09-12
Payer: COMMERCIAL

## 2018-09-12 VITALS — RESPIRATION RATE: 16 BRPM | HEART RATE: 64 BPM | DIASTOLIC BLOOD PRESSURE: 78 MMHG | SYSTOLIC BLOOD PRESSURE: 116 MMHG

## 2018-09-12 DIAGNOSIS — N39.3 FEMALE STRESS INCONTINENCE: Primary | ICD-10-CM

## 2018-09-12 LAB
ALBUMIN UR-MCNC: NEGATIVE MG/DL
APPEARANCE UR: CLEAR
BILIRUB UR QL STRIP: NEGATIVE
COLOR UR AUTO: YELLOW
GLUCOSE UR STRIP-MCNC: NEGATIVE MG/DL
HGB UR QL STRIP: ABNORMAL
KETONES UR STRIP-MCNC: NEGATIVE MG/DL
LEUKOCYTE ESTERASE UR QL STRIP: NEGATIVE
NITRATE UR QL: NEGATIVE
PH UR STRIP: 6 PH (ref 5–7)
RBC #/AREA URNS AUTO: ABNORMAL /HPF
SOURCE: ABNORMAL
SP GR UR STRIP: 1.01 (ref 1–1.03)
UROBILINOGEN UR STRIP-ACNC: 0.2 EU/DL (ref 0.2–1)
WBC #/AREA URNS AUTO: ABNORMAL /HPF

## 2018-09-12 PROCEDURE — 81001 URINALYSIS AUTO W/SCOPE: CPT | Performed by: UROLOGY

## 2018-09-12 PROCEDURE — 99213 OFFICE O/P EST LOW 20 MIN: CPT | Performed by: UROLOGY

## 2018-09-12 NOTE — PROGRESS NOTES
F/u WILLIAMS, s/p Altis sling 5/18    Still some leak with cough, sneeze, hit golf ball. Otherwise completely dry and pad-free day and nite.    Denies dysuria, gross hematuria, frequency; noc x 1. BM's are satisfactory.      Current Outpatient Prescriptions   Medication     acyclovir (ZOVIRAX) 200 MG capsule     ascorbic acid (VITAMIN C) 1000 MG TABS     Calcium Carbonate-Vit D-Min (CALCIUM 1200 PO)     Cholecalciferol (VITAMIN D-3 PO)     fish oil-omega-3 fatty acids (FISH OIL) 1000 MG capsule     MAGNESIUM PO     multivitamin, therapeutic with minerals (MULTI-VITAMIN) TABS     metroNIDAZOLE (METROGEL) 0.75 % topical gel     [DISCONTINUED] ORDER FOR DME     No current facility-administered medications for this visit.          Results for orders placed or performed in visit on 09/12/18   UA reflex to Microscopic   Result Value Ref Range    Color Urine Yellow     Appearance Urine Clear     Glucose Urine Negative NEG^Negative mg/dL    Bilirubin Urine Negative NEG^Negative    Ketones Urine Negative NEG^Negative mg/dL    Specific Gravity Urine 1.010 1.003 - 1.035    Blood Urine Trace (A) NEG^Negative    pH Urine 6.0 5.0 - 7.0 pH    Protein Albumin Urine Negative NEG^Negative mg/dL    Urobilinogen Urine 0.2 0.2 - 1.0 EU/dL    Nitrite Urine Negative NEG^Negative    Leukocyte Esterase Urine Negative NEG^Negative    Source Midstream Urine        IMP:  1. Mild residual WILLIAMS after sling      PLAN:  1. Discussed situation with patient in detail.  2. Offered consideration for Durasphere; discussed in detail rationale, mech of action, process, success, risks, complications, potential need for repeat injection, utility of UDS prior; pt expresses understanding, suggests that she may wish to proceed in early 2019  3. 15 minutes spent with patient, more than 50% spent in counseling and coordination of care for WILLIAMS.  4.Copy

## 2018-09-12 NOTE — MR AVS SNAPSHOT
After Visit Summary   9/12/2018    Shayne Douglass    MRN: [id number]           Patient Information     Date Of Birth          1955        Visit Information        Provider Department      9/12/2018 7:30 AM Ras White MD [facility]        Today's Diagnoses     Female stress incontinence    -  1       Follow-ups after your visit        Who to contact     If you have questions or need follow up information about today's clinic visit or your schedule please contact [facility] directly at [phone].  Normal or non-critical lab and imaging results will be communicated to you by MyChart, letter or phone within 4 business days after the clinic has received the results. If you do not hear from us within 7 days, please contact the clinic through MyChart or phone. If you have a critical or abnormal lab result, we will notify you by phone as soon as possible.  Submit refill requests through [employer] or call your pharmacy and they will forward the refill request to us. Please allow 3 business days for your refill to be completed.          Additional Information About Your Visit        Care EveryWhere ID     This is your Care EveryWhere ID. This could be used by other organizations to access your [place] medical records  [id number]        Your Vitals Were     Pulse Respirations                64 16           Blood Pressure from Last 3 Encounters:   09/12/18 116/78   07/05/18 127/79   06/07/18 130/84    Weight from Last 3 Encounters:   07/05/18 79.8 kg (176 lb)   05/01/18 79.5 kg (175 lb 3.2 oz)   02/28/18 78 kg (172 lb)              We Performed the Following     UA reflex to Microscopic     Urine Microscopic          Today's Medication Changes          These changes are accurate as of 9/12/18  8:05 AM.  If you have any questions, ask your nurse or doctor.               Stop taking these medicines if you haven't already. Please contact your care team if you have  questions.     HYDROcodone-acetaminophen 5-325 MG per tablet   Commonly known as:  NORCO   Stopped by:  Ras White MD           order for DME   Stopped by:  Ras White MD                    Primary Care Provider Office Phone # Fax #    HAYLEY [facility] [phone] [address] [address]        Equal Access to Services     [facility]: Hadii aad ku hadasho Soomaali, waaxda luqadaha, qaybta kaalmada adeegyada, waxay idiin hayaan adeeg kharash la'aan ah. So [facility] [phone].    ATENCIÓN: Si habla español, tiene a garcia disposición servicios gratuitos de asistencia lingüística. Llame al [phone].    We comply with applicable federal civil rights laws and [place] laws. We do not discriminate on the basis of race, color, national origin, age, disability, sex, sexual orientation, or gender identity.            Thank you!     Thank you for choosing [facility]  for your care. Our goal is always to provide you with excellent care. Hearing back from our patients is one way we can continue to improve our services. Please take a few minutes to complete the written survey that you may receive in the mail after your visit with us. Thank you!             Your Updated Medication List - Protect others around you: Learn how to safely use, store and throw away your medicines at www.disposemymeds.org.          This list is accurate as of 9/12/18  8:05 AM.  Always use your most recent med list.                   Brand Name Dispense Instructions for use Diagnosis    acyclovir 200 MG capsule    ZOVIRAX    25 capsule    Take 1-2 capsules (200-400 mg) by mouth 5 times daily TAKE AS DIRECTED FOR COLD SORES    Recurrent cold sores       ascorbic acid 1000 MG Tabs    vitamin C     Take 1,000 mg by mouth daily.        CALCIUM 1200 PO      Take  by mouth daily.        fish oil-omega-3 fatty acids 1000 MG capsule      ONCE DAILY        MAGNESIUM PO       Take  by mouth.        metroNIDAZOLE 0.75 % topical gel    METROGEL    30 g    Apply topically daily as needed.    Vaginal irritation       Multi-vitamin Tabs tablet      Take 1 tablet by mouth daily.        VITAMIN D-3 PO      Take 4,000 mg by mouth.

## 2018-11-21 ENCOUNTER — OFFICE VISIT (OUTPATIENT)
Dept: UROLOGY | Facility: CLINIC | Age: 63
End: 2018-11-21
Payer: COMMERCIAL

## 2018-11-21 DIAGNOSIS — N39.3 FEMALE STRESS INCONTINENCE: Primary | ICD-10-CM

## 2018-11-21 DIAGNOSIS — N39.3 SUI (STRESS URINARY INCONTINENCE, FEMALE): Primary | ICD-10-CM

## 2018-11-21 PROCEDURE — 51797 INTRAABDOMINAL PRESSURE TEST: CPT

## 2018-11-21 PROCEDURE — 99213 OFFICE O/P EST LOW 20 MIN: CPT | Mod: 25 | Performed by: UROLOGY

## 2018-11-21 PROCEDURE — 51728 CYSTOMETROGRAM W/VP: CPT

## 2018-11-21 PROCEDURE — 51784 ANAL/URINARY MUSCLE STUDY: CPT

## 2018-11-21 NOTE — MR AVS SNAPSHOT
After Visit Summary   11/21/2018    Shayne Douglass    MRN: [id number]           Patient Information     Date Of Birth          1955        Visit Information        Provider Department      11/21/2018 1:15 PM Ras White MD [facility]        Today's Diagnoses     WILLIAMS (stress urinary incontinence, female)    -  1       Follow-ups after your visit        Who to contact     If you have questions or need follow up information about today's clinic visit or your schedule please contact [facility] directly at [phone].  Normal or non-critical lab and imaging results will be communicated to you by MyChart, letter or phone within 4 business days after the clinic has received the results. If you do not hear from us within 7 days, please contact the clinic through MyChart or phone. If you have a critical or abnormal lab result, we will notify you by phone as soon as possible.  Submit refill requests through [employer] or call your pharmacy and they will forward the refill request to us. Please allow 3 business days for your refill to be completed.          Additional Information About Your Visit        Care EveryWhere ID     This is your Care EveryWhere ID. This could be used by other organizations to access your [place] medical records  [id number]         Blood Pressure from Last 3 Encounters:   09/12/18 116/78   07/05/18 127/79   06/07/18 130/84    Weight from Last 3 Encounters:   07/05/18 79.8 kg (176 lb)   05/01/18 79.5 kg (175 lb 3.2 oz)   02/28/18 78 kg (172 lb)              Today, you had the following     No orders found for display       Primary Care Provider Office Phone # Fax #    Sol HAYLEY [facility] [phone]       [address]  [place] [address]        Equal Access to Services     MILAN HOLT AH: Maurice rosaleso Soomaali, waaxda luqadaha, qaybta kaalmada adeegyada, soy vasquez. So  [facility] [phone].    ATENCIÓN: Si audra raza, tiene a garcia disposición servicios gratuitos de asistencia lingüística. Maylin woody [phone].    We comply with applicable federal civil rights laws and [place] laws. We do not discriminate on the basis of race, color, national origin, age, disability, sex, sexual orientation, or gender identity.            Thank you!     Thank you for choosing [facility]  for your care. Our goal is always to provide you with excellent care. Hearing back from our patients is one way we can continue to improve our services. Please take a few minutes to complete the written survey that you may receive in the mail after your visit with us. Thank you!             Your Updated Medication List - Protect others around you: Learn how to safely use, store and throw away your medicines at www.disposemymeds.org.          This list is accurate as of 11/21/18  2:41 PM.  Always use your most recent med list.                   Brand Name Dispense Instructions for use Diagnosis    acyclovir 200 MG capsule    ZOVIRAX    25 capsule    Take 1-2 capsules (200-400 mg) by mouth 5 times daily TAKE AS DIRECTED FOR COLD SORES    Recurrent cold sores       ascorbic acid 1000 MG Tabs    vitamin C     Take 1,000 mg by mouth daily.        CALCIUM 1200 PO      Take  by mouth daily.        fish oil-omega-3 fatty acids 1000 MG capsule      ONCE DAILY        MAGNESIUM PO      Take  by mouth.        metroNIDAZOLE 0.75 % topical gel    METROGEL    30 g    Apply topically daily as needed.    Vaginal irritation       Multi-vitamin Tabs tablet      Take 1 tablet by mouth daily.        VITAMIN D-3 PO      Take 4,000 mg by mouth.

## 2018-11-21 NOTE — PROGRESS NOTES
F/u WILLIAMS, s/p Altis sling 5/18      No change in sx's, still with WILLIAMS with large cough, etc.      Reviewed UDS in detail:  PVR: 85 ml  Filling: Capacity 582 ml  VLPP: No leakage despite strong pressures until 443 ml; leaks there at 100+ cm [place]; repeats at 553 ml  No UDC's      IMP:  1. Residual WILLIAMS with relatively high VLPP toward the second half of bladder filling      PLAN:  1. Discussed situation with patient in detail. Options include conservative rx vs intervention with bulking agent; discussed nature of bulking with Durasphere in detail including rationale, technique, risks, complications, recovery, results, potential need for additional therapy, etc; at this point pt elects to proceed with Durasphere; we will schedule    2. 15 minutes spent with patient, more than 50% spent in counseling and coordination of care for WILLIAMS.

## 2018-11-21 NOTE — MR AVS SNAPSHOT
After Visit Summary   11/21/2018    Shayne Douglass    MRN: [id number]           Patient Information     Date Of Birth          1955        Visit Information        Provider Department      11/21/2018 1:00 PM [facility]        Today's Diagnoses     Female stress incontinence    -  1       Follow-ups after your visit        Your next 10 appointments already scheduled     Nov 21, 2018  1:15 PM CST   Return Visit with Ras White MD   [facility] [facility] [id number] [phone]              Who to contact     If you have questions or need follow up information about today's clinic visit or your schedule please contact [facility] directly at [phone].  Normal or non-critical lab and imaging results will be communicated to you by MyChart, letter or phone within 4 business days after the clinic has received the results. If you do not hear from us within 7 days, please contact the clinic through MyChart or phone. If you have a critical or abnormal lab result, we will notify you by phone as soon as possible.  Submit refill requests through [employer] or call your pharmacy and they will forward the refill request to us. Please allow 3 business days for your refill to be completed.          Additional Information About Your Visit        Care EveryWhere ID     This is your Care EveryWhere ID. This could be used by other organizations to access your [facility] medical records  [id number]         Blood Pressure from Last 3 Encounters:   09/12/18 116/78   07/05/18 127/79   06/07/18 130/84    Weight from Last 3 Encounters:   07/05/18 79.8 kg (176 lb)   05/01/18 79.5 kg (175 lb 3.2 oz)   02/28/18 78 kg (172 lb)              We Performed the Following     CYSTOMETROGRAM COMPLEX W VOIDING PRESS PROFILE     CYSTOMETROGRAM COMPLEX     EMG ANAL/URETH SPHINCTER, OTHER THAN NEEDLE     VOIDING  PRESSURE STUDY, INTRA-ABDOMINAL        Primary Care Provider Office Phone # Fax #    Sol Anderson, HAYLEY [facility] [phone] [address] [facility] [facility] [address]        Equal Access to Services     MILAN HOLT : Hadii greyson ku hadrebeccao Soomaali, waaxda luqadaha, qaybta kaalmada adeegyada, waxvikas matuten li herndon laZeeshanmiah . So [facility] [phone].    ATENCIÓN: Si habla español, tiene a garcia disposición servicios gratuitos de asistencia lingüística. Llame al [phone].    We comply with applicable federal civil rights laws and [place] laws. We do not discriminate on the basis of race, color, national origin, age, disability, sex, sexual orientation, or gender identity.            Thank you!     Thank you for choosing [facility]  for your care. Our goal is always to provide you with excellent care. Hearing back from our patients is one way we can continue to improve our services. Please take a few minutes to complete the written survey that you may receive in the mail after your visit with us. Thank you!             Your Updated Medication List - Protect others around you: Learn how to safely use, store and throw away your medicines at www.disposemymeds.org.          This list is accurate as of 11/21/18  1:14 PM.  Always use your most recent med list.                   Brand Name Dispense Instructions for use Diagnosis    acyclovir 200 MG capsule    ZOVIRAX    25 capsule    Take 1-2 capsules (200-400 mg) by mouth 5 times daily TAKE AS DIRECTED FOR COLD SORES    Recurrent cold sores       ascorbic acid 1000 MG Tabs    vitamin C     Take 1,000 mg by mouth daily.        CALCIUM 1200 PO      Take  by mouth daily.        fish oil-omega-3 fatty acids 1000 MG capsule      ONCE DAILY        MAGNESIUM PO      Take  by mouth.        metroNIDAZOLE 0.75 % topical gel    METROGEL    30 g    Apply topically daily as needed.    Vaginal irritation       Multi-vitamin Tabs tablet       Take 1 tablet by mouth daily.        VITAMIN D-3 PO      Take 4,000 mg by mouth.

## 2018-12-18 ENCOUNTER — PATIENT OUTREACH (OUTPATIENT)
Dept: CARE COORDINATION | Facility: CLINIC | Age: 63
End: 2018-12-18

## 2018-12-27 ENCOUNTER — OFFICE VISIT (OUTPATIENT)
Dept: DERMATOLOGY | Facility: CLINIC | Age: 63
End: 2018-12-27

## 2018-12-27 DIAGNOSIS — L71.9 ROSACEA: ICD-10-CM

## 2018-12-27 DIAGNOSIS — L82.0 BENIGN LICHENOID KERATOSIS: Primary | ICD-10-CM

## 2018-12-27 DIAGNOSIS — L82.1 SK (SEBORRHEIC KERATOSIS): ICD-10-CM

## 2018-12-27 DIAGNOSIS — L81.4 LENTIGINES: ICD-10-CM

## 2018-12-27 ASSESSMENT — PAIN SCALES - GENERAL
PAINLEVEL: NO PAIN (0)
PAINLEVEL: NO PAIN (1)

## 2018-12-27 NOTE — PROGRESS NOTES
"[facility] Dermatology Note      Dermatology Problem List:   1.  Benign lichenoid keratosis, left malar cheek.  Suspected clinical diagnosis.  Treated with LN2, 12/27/2018.  Plan to recheck in April.        Encounter Date:  12/27/2018       CC:  Lesion on left cheek      History of Present Illness:   Shayne Douglass is a pleasant, 63-year-old woman with no personal or family history of skin cancer who presents today in self-referral for a concerning lesion on the left cheek.  She notes that she was in her baseline state of health until approximately 5 weeks ago.  She notes that her chronic \"brown age spot\" on the left malar cheek became pink and more recently slightly gray-brown discolored.  It does not seem to be growing or otherwise changing.  She denies irritation at the area.  She tried several treatment options over-the-counter, including Neosporin and essential oils, none of which have made any significant change.  She denies any other concerning findings today.  She requests an upper body skin examination.  Endorses sun protection.  No previous dysplastic nevi.      ROS: no fevers, in normal state of health. Skin per HPI.     Past Medical History:    Negative for skin cancer.      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        Family History:   Negative for skin cancer.        Current Outpatient Medications   Medication     acyclovir (ZOVIRAX) 200 MG capsule     ascorbic acid (VITAMIN C) 1000 MG TABS     Calcium Carbonate-Vit D-Min (CALCIUM 1200 PO)     Cholecalciferol (VITAMIN D-3 PO)     fish oil-omega-3 fatty acids (FISH OIL) 1000 MG capsule     MAGNESIUM PO     metroNIDAZOLE (METROCREAM) 0.75 % external cream     metroNIDAZOLE (METROGEL) 0.75 % topical gel     multivitamin w/minerals (MULTI-VITAMIN) tablet     No current facility-administered medications for this visit.        No Known Allergies      Physical exam:   There were no vitals " taken for this visit.    GENERAL:  Well appearing, well nourished, in no acute distress, appearing stated age, cooperative with the examination.   SKIN:  An upper body skin examination is performed today, including the scalp, head, neck, chest, upper extremities, hands and nail units.  The patient has many round macules overlying sun-exposed sites.  She has several waxy, zgehv-ez-qvuxfnfpl macules and papules over the areas evaluated, including the face, trunk and extremities.  These are most keratotic on the back.  The patient has a 0.9 cm, round/oval, gray/pink macule on the left malar cheek with a light-brown, stuck-on    patch immediately superior and at the periphery.  Dermoscopy reveals cerebriform morphology at the edges and light, small, homogenous, gray granules without any overlying annular, granular or boil features.  No other concerning findings on examination today.  She does have several light-pink, inflammatory papules on the malar cheeks.       Impression/Plan:   1.  Benign lichenoid keratosis, left malar cheek.  Dermoscopy findings as well as history consistent with this diagnosis today.  Reviewed today in detail the etiology, pathophysiology and treatment options.   Plan:  -PROCEDURE:  After discussion of the risks and benefits, the lesion was treated with one 15-20 second freeze-thaw cycle with liquid nitrogen cryotherapy.  This was well tolerated by the patient.  Aftercare instructions were provided.  We will see the patient back in 4 months to reevaluate the site and ensure resolution.     2.  Rosacea, mild.  The patient endorses environmental flushing, particularly with alcohol use.  Several inflammatory papules on the cheeks today.    -After some discussion of the treatment options, we will start metronidazole cream b.i.d.     3.  Benign seborrheic keratoses and lentigines, reassurance.  Nothing further to do.        The patient was seen and discussed with Dr. Nancy Campbell, who was staff  for this encounter.      Follow up in 4 months or sooner as needed.     This note was dictated and edited by MD Zeke Wiley MD  PGY4 Dermatology  [phone]        Staff Involved:   Dictated by Resident(Zeke Sarkar MD)/Staff(as above)   .I, Nancy Campbell MD, saw this patient with the resident and agree with the resident s findings and plan of care as documented in the resident s note.  .I was present for the entire procedure. Nancy Campbell MD

## 2018-12-27 NOTE — LETTER
"12/27/2018       RE: Shayne Douglass  [address]  [place] [address]     Dear Colleague,    Thank you for referring your patient, Shayne Douglass, to the [facility] DERMATOLOGY at [facility]. Please see a copy of my visit note below.    [facility] Dermatology Note      Dermatology Problem List:   1.  Benign lichenoid keratosis, left malar cheek.  Suspected clinical diagnosis.  Treated with LN2, 12/27/2018.  Plan to recheck in April.        Encounter Date:  12/27/2018       CC:  Lesion on left cheek      History of Present Illness:   Shayne Douglass is a pleasant, 63-year-old woman with no personal or family history of skin cancer who presents today in self-referral for a concerning lesion on the left cheek.  She notes that she was in her baseline state of health until approximately 5 weeks ago.  She notes that her chronic \"brown age spot\" on the left malar cheek became pink and more recently slightly gray-brown discolored.  It does not seem to be growing or otherwise changing.  She denies irritation at the area.  She tried several treatment options over-the-counter, including Neosporin and essential oils, none of which have made any significant change.  She denies any other concerning findings today.  She requests an upper body skin examination.  Endorses sun protection.  No previous dysplastic nevi.      ROS: no fevers, in normal state of health. Skin per HPI.     Past Medical History:    Negative for skin cancer.      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        Family History:   Negative for skin cancer.        Current Outpatient Medications   Medication     acyclovir (ZOVIRAX) 200 MG capsule     ascorbic acid (VITAMIN C) 1000 MG TABS     Calcium Carbonate-Vit D-Min (CALCIUM 1200 PO)     Cholecalciferol (VITAMIN D-3 PO)     fish oil-omega-3 fatty acids (FISH OIL) 1000 MG capsule     MAGNESIUM PO "     metroNIDAZOLE (METROCREAM) 0.75 % external cream     metroNIDAZOLE (METROGEL) 0.75 % topical gel     multivitamin w/minerals (MULTI-VITAMIN) tablet     No current facility-administered medications for this visit.        No Known Allergies      Physical exam:   There were no vitals taken for this visit.    GENERAL:  Well appearing, well nourished, in no acute distress, appearing stated age, cooperative with the examination.   SKIN:  An upper body skin examination is performed today, including the scalp, head, neck, chest, upper extremities, hands and nail units.  The patient has many round macules overlying sun-exposed sites.  She has several waxy, ipqpk-ti-ehkapdfxq macules and papules over the areas evaluated, including the face, trunk and extremities.  These are most keratotic on the back.  The patient has a 0.9 cm, round/oval, gray/pink macule on the left malar cheek with a light-brown, stuck-on    patch immediately superior and at the periphery.  Dermoscopy reveals cerebriform morphology at the edges and light, small, homogenous, gray granules without any overlying annular, granular or boil features.  No other concerning findings on examination today.  She does have several light-pink, inflammatory papules on the malar cheeks.       Impression/Plan:   1.  Benign lichenoid keratosis, left malar cheek.  Dermoscopy findings as well as history consistent with this diagnosis today.  Reviewed today in detail the etiology, pathophysiology and treatment options.   Plan:  -PROCEDURE:  After discussion of the risks and benefits, the lesion was treated with one 15-20 second freeze-thaw cycle with liquid nitrogen cryotherapy.  This was well tolerated by the patient.  Aftercare instructions were provided.  We will see the patient back in 4 months to reevaluate the site and ensure resolution.     2.  Rosacea, mild.  The patient endorses environmental flushing, particularly with alcohol use.  Several inflammatory papules  on the cheeks today.    -After some discussion of the treatment options, we will start metronidazole cream b.i.d.     3.  Benign seborrheic keratoses and lentigines, reassurance.  Nothing further to do.        The patient was seen and discussed with Dr. Nancy Campbell, who was staff for this encounter.      Follow up in 4 months or sooner as needed.     This note was dictated and edited by MD Zeke Wiley MD  PGY4 Dermatology  [phone]        Staff Involved:   Dictated by Resident(Zeke Sarkar MD)/Staff(as above)   .I, Nancy Campbell MD, saw this patient with the resident and agree with the resident s findings and plan of care as documented in the resident s note.  .I was present for the entire procedure.     Nancy Campbell MD

## 2018-12-27 NOTE — PATIENT INSTRUCTIONS
Cryotherapy    What is it?    Use of a very cold liquid, such as liquid nitrogen, to freeze and destroy abnormal skin cells that need to be removed    What should I expect?    Tenderness and redness    A small blister that might grow and fill with dark purple blood. There may be crusting.    More than one treatment may be needed if the lesions do not go away.    How do I care for the treated area?    Gently wash the area with your hands when bathing.    Use a thin layer of Vaseline to help with healing. You may use a Band-Aid.     The area should heal within 7-10 days and may leave behind a pink or lighter color.     Do not use an antibiotic or Neosporin ointment.     You may take acetaminophen (Tylenol) for pain.     Call your Doctor if you have:    Severe pain    Signs of infection (warmth, redness, cloudy yellow drainage, and or a bad smell)    Questions or concerns    Who should I call with questions?       [facility]: [phone]       Health system: [phone]       For urgent needs outside of business hours call the [facility] at [phone]        and ask for the dermatology resident on call              The ABCDEs of Melanoma    Skin cancer can develop anywhere on the skin. Ask someone for help when checking your skin, especially in hard to see places. If you notice a mole different from others, or that changes, enlarges, itches, or bleeds (even if it is small), you should see a dermatologist.

## 2019-01-21 ENCOUNTER — TRANSFERRED RECORDS (OUTPATIENT)
Dept: HEALTH INFORMATION MANAGEMENT | Facility: CLINIC | Age: 64
End: 2019-01-21

## 2019-03-26 ENCOUNTER — ANCILLARY PROCEDURE (OUTPATIENT)
Dept: MAMMOGRAPHY | Facility: CLINIC | Age: 64
End: 2019-03-26
Attending: NURSE PRACTITIONER
Payer: COMMERCIAL

## 2019-03-26 DIAGNOSIS — Z12.31 VISIT FOR SCREENING MAMMOGRAM: ICD-10-CM

## 2019-03-26 PROCEDURE — 77067 SCR MAMMO BI INCL CAD: CPT | Mod: TC

## 2019-04-08 ENCOUNTER — TELEPHONE (OUTPATIENT)
Dept: UROLOGY | Facility: CLINIC | Age: 64
End: 2019-04-08

## 2019-04-08 NOTE — TELEPHONE ENCOUNTER
Patient calling to schedule Durasphere.   Last seen 11/2018.   Ok to schedule?  Kirti Bryson RN

## 2019-04-12 NOTE — TELEPHONE ENCOUNTER
Surgery Pre-Certification    Medical Record Number: [id number]  Shayne Douglass  YOB: 1955   Phone: [phone] (home) [phone] (work)  Primary Provider: Sol Anderson    Diagnosis:  N39.3 Stress Incontinence      Surgeon: Ronni White        Surgical Procedure: Durasphere CPT code 77515  BMI:  NA  ICD-10 Coded: N39.3     CPT code 20266  Hospital: In clinic   In-Patient/ Out-Patient status: In clinic   Length of surgery: 30  Anesthesia: Local   Implanted Cardiac Device: N/A     Date of Surgery:  5/2/2019  When post-op appointment needed:  2 weeks   Special Requests/ Equipment:: NA  CPM Needed: NA   Requestor:  Kirti Bryson RN     No prior auth per [employer] list. 04/12/2019

## 2019-04-26 ENCOUNTER — TELEPHONE (OUTPATIENT)
Dept: FAMILY MEDICINE | Facility: CLINIC | Age: 64
End: 2019-04-26

## 2019-04-26 NOTE — TELEPHONE ENCOUNTER
Reason for call:  Other   Patient called regarding (reason for call): call back  Additional comments: Patient would like to know what her blood type is.    Phone number to reach patient:  Work number on file:  [phone] (work)    Best Time:  Anytime    Can we leave a detailed message on this number?  YES

## 2019-04-26 NOTE — TELEPHONE ENCOUNTER
This is not routinely checked  No results noted for this    It is usually checked only when medically necessary such as for blood donation, blood transfusion, surgeries that may need blood products, etc  If she has had any of this done, she would need to check with them for the results. We do not have them    Ji Ortiz RN

## 2019-04-29 NOTE — TELEPHONE ENCOUNTER
Spoke to patient and informed her of message from RN.  Shanna GODFREY CMA ([facility])

## 2019-05-02 ENCOUNTER — OFFICE VISIT (OUTPATIENT)
Dept: UROLOGY | Facility: CLINIC | Age: 64
End: 2019-05-02
Payer: COMMERCIAL

## 2019-05-02 VITALS — OXYGEN SATURATION: 96 % | HEART RATE: 83 BPM | DIASTOLIC BLOOD PRESSURE: 81 MMHG | SYSTOLIC BLOOD PRESSURE: 126 MMHG

## 2019-05-02 DIAGNOSIS — N39.3 SUI (STRESS URINARY INCONTINENCE, FEMALE): Primary | ICD-10-CM

## 2019-05-02 LAB
ALBUMIN UR-MCNC: 30 MG/DL
APPEARANCE UR: CLEAR
BACTERIA #/AREA URNS HPF: ABNORMAL /HPF
BILIRUB UR QL STRIP: NEGATIVE
COLOR UR AUTO: YELLOW
GLUCOSE UR STRIP-MCNC: NEGATIVE MG/DL
HGB UR QL STRIP: ABNORMAL
KETONES UR STRIP-MCNC: NEGATIVE MG/DL
LEUKOCYTE ESTERASE UR QL STRIP: NEGATIVE
MUCOUS THREADS #/AREA URNS LPF: PRESENT /LPF
NITRATE UR QL: NEGATIVE
NON-SQ EPI CELLS #/AREA URNS LPF: ABNORMAL /LPF
PH UR STRIP: 6.5 PH (ref 5–7)
RBC #/AREA URNS AUTO: ABNORMAL /HPF
SOURCE: ABNORMAL
SP GR UR STRIP: 1.02 (ref 1–1.03)
UROBILINOGEN UR STRIP-ACNC: 0.2 EU/DL (ref 0.2–1)
WBC #/AREA URNS AUTO: ABNORMAL /HPF

## 2019-05-02 PROCEDURE — 51715 ENDOSCOPIC INJECTION/IMPLANT: CPT | Performed by: UROLOGY

## 2019-05-02 PROCEDURE — 81001 URINALYSIS AUTO W/SCOPE: CPT | Performed by: UROLOGY

## 2019-05-02 PROCEDURE — L8606 SYNTHETIC IMPLNT URINARY 1ML: HCPCS | Performed by: UROLOGY

## 2019-05-02 RX ORDER — CIPROFLOXACIN 500 MG/1
500 TABLET, FILM COATED ORAL ONCE
Status: COMPLETED | OUTPATIENT
Start: 2019-05-02 | End: 2019-05-03

## 2019-05-02 NOTE — PROGRESS NOTES
Prior to injection, verified patient identity using patient's name and date of birth.  Due to injection administration, patient instructed to remain in clinic for 15 minutes  afterwards, and to report any adverse reaction to me immediately.    durasphere  Coloplast   Lot [id number]  Exp 10/17/2019  The following medication was given:     MEDICATION:  Ciprofloxin  ROUTE: PO  SITE: mouth  DOSE: 500  LOT #: [id number]  : [employer]  EXPIRATION DATE: 10/20  NDC#: 34682-045-87   Was there drug waste? No

## 2019-05-02 NOTE — PROGRESS NOTES
F/u WILLIAMS, s/p Altis sling 5/18, here for Durasphere #1.    Questions answered. Informed consent obtained.      Current Outpatient Medications   Medication     acyclovir (ZOVIRAX) 200 MG capsule     ascorbic acid (VITAMIN C) 1000 MG TABS     Calcium Carbonate-Vit D-Min (CALCIUM 1200 PO)     Cholecalciferol (VITAMIN D-3 PO)     fish oil-omega-3 fatty acids (FISH OIL) 1000 MG capsule     MAGNESIUM PO     metroNIDAZOLE (METROCREAM) 0.75 % external cream     metroNIDAZOLE (METROGEL) 0.75 % topical gel     multivitamin w/minerals (MULTI-VITAMIN) tablet     No current facility-administered medications for this visit.        Results for orders placed or performed in visit on 05/02/19   UA reflex to Microscopic   Result Value Ref Range    Color Urine Yellow     Appearance Urine Clear     Glucose Urine Negative NEG^Negative mg/dL    Bilirubin Urine Negative NEG^Negative    Ketones Urine Negative NEG^Negative mg/dL    Specific Gravity Urine 1.020 1.003 - 1.035    Blood Urine Moderate (A) NEG^Negative    pH Urine 6.5 5.0 - 7.0 pH    Protein Albumin Urine 30 (A) NEG^Negative mg/dL    Urobilinogen Urine 0.2 0.2 - 1.0 EU/dL    Nitrite Urine Negative NEG^Negative    Leukocyte Esterase Urine Negative NEG^Negative    Source Midstream Urine          Local injected at 3 and 9 o'clock; then Durasphere 1 ml syringe at each pocket. Excellent coaptation and satisfactory hemostasis noted.      IMP:  1. Durasphere #1 after sling      PLAN:  1. Precautions given in detail  2. Cipro x 1  3. RTC 1 mo  4. Set realistic expectations

## 2019-05-03 RX ADMIN — CIPROFLOXACIN 500 MG: 500 TABLET, FILM COATED ORAL at 07:39

## 2019-06-06 ENCOUNTER — OFFICE VISIT (OUTPATIENT)
Dept: UROLOGY | Facility: CLINIC | Age: 64
End: 2019-06-06
Payer: COMMERCIAL

## 2019-06-06 VITALS — HEART RATE: 69 BPM | OXYGEN SATURATION: 97 % | DIASTOLIC BLOOD PRESSURE: 83 MMHG | SYSTOLIC BLOOD PRESSURE: 131 MMHG

## 2019-06-06 DIAGNOSIS — N39.3 FEMALE STRESS INCONTINENCE: Primary | ICD-10-CM

## 2019-06-06 LAB
ALBUMIN UR-MCNC: NEGATIVE MG/DL
APPEARANCE UR: CLEAR
BACTERIA #/AREA URNS HPF: ABNORMAL /HPF
BILIRUB UR QL STRIP: NEGATIVE
COLOR UR AUTO: YELLOW
GLUCOSE UR STRIP-MCNC: NEGATIVE MG/DL
HGB UR QL STRIP: ABNORMAL
KETONES UR STRIP-MCNC: NEGATIVE MG/DL
LEUKOCYTE ESTERASE UR QL STRIP: NEGATIVE
NITRATE UR QL: NEGATIVE
NON-SQ EPI CELLS #/AREA URNS LPF: ABNORMAL /LPF
PH UR STRIP: 6.5 PH (ref 5–7)
RBC #/AREA URNS AUTO: ABNORMAL /HPF
SOURCE: ABNORMAL
SP GR UR STRIP: 1.01 (ref 1–1.03)
UROBILINOGEN UR STRIP-ACNC: 0.2 EU/DL (ref 0.2–1)
WBC #/AREA URNS AUTO: ABNORMAL /HPF

## 2019-06-06 PROCEDURE — 99213 OFFICE O/P EST LOW 20 MIN: CPT | Performed by: UROLOGY

## 2019-06-06 PROCEDURE — 81001 URINALYSIS AUTO W/SCOPE: CPT | Performed by: UROLOGY

## 2019-06-06 NOTE — PROGRESS NOTES
"F/u WILLIAMS, s/p Altis sling 5/18, Durasphere #1 on 5/2/19.    \"It works! I can now play golf or even dance without leaking and without wearing a pad.\"    Denies dysuria, gross hematuria, frequency; nocturia x 1.     Also reports no dimunition of force of stream. Very pleased.        Results for orders placed or performed in visit on 06/06/19   UA reflex to Microscopic   Result Value Ref Range    Color Urine Yellow     Appearance Urine Clear     Glucose Urine Negative NEG^Negative mg/dL    Bilirubin Urine Negative NEG^Negative    Ketones Urine Negative NEG^Negative mg/dL    Specific Gravity Urine 1.010 1.003 - 1.035    Blood Urine Trace (A) NEG^Negative    pH Urine 6.5 5.0 - 7.0 pH    Protein Albumin Urine Negative NEG^Negative mg/dL    Urobilinogen Urine 0.2 0.2 - 1.0 EU/dL    Nitrite Urine Negative NEG^Negative    Leukocyte Esterase Urine Negative NEG^Negative    Source Midstream Urine        IMP:  1. WILLIAMS, now resolved after sling + Durasphere #1      PLAN:  1. Discussed situation with patient in detail.  2. RTC only PRN  3. 15 minutes spent with patient, more than 50% spent in counseling and coordination of care for WILLIAMS.    "

## 2020-05-27 ENCOUNTER — ANCILLARY PROCEDURE (OUTPATIENT)
Dept: MAMMOGRAPHY | Facility: CLINIC | Age: 65
End: 2020-05-27
Payer: COMMERCIAL

## 2020-05-27 DIAGNOSIS — Z12.31 VISIT FOR SCREENING MAMMOGRAM: ICD-10-CM

## 2020-05-27 PROCEDURE — 77067 SCR MAMMO BI INCL CAD: CPT | Mod: TC

## 2020-07-13 ENCOUNTER — ANCILLARY PROCEDURE (OUTPATIENT)
Dept: GENERAL RADIOLOGY | Facility: CLINIC | Age: 65
End: 2020-07-13
Attending: NURSE PRACTITIONER
Payer: COMMERCIAL

## 2020-07-13 ENCOUNTER — OFFICE VISIT (OUTPATIENT)
Dept: FAMILY MEDICINE | Facility: CLINIC | Age: 65
End: 2020-07-13
Payer: COMMERCIAL

## 2020-07-13 VITALS
HEIGHT: 66 IN | HEART RATE: 70 BPM | SYSTOLIC BLOOD PRESSURE: 106 MMHG | OXYGEN SATURATION: 98 % | BODY MASS INDEX: 29.06 KG/M2 | RESPIRATION RATE: 14 BRPM | DIASTOLIC BLOOD PRESSURE: 82 MMHG | WEIGHT: 180.8 LBS | TEMPERATURE: 97.1 F

## 2020-07-13 DIAGNOSIS — L71.9 ROSACEA: ICD-10-CM

## 2020-07-13 DIAGNOSIS — Z12.4 SCREENING FOR CERVICAL CANCER: ICD-10-CM

## 2020-07-13 DIAGNOSIS — B00.1 RECURRENT COLD SORES: ICD-10-CM

## 2020-07-13 DIAGNOSIS — Z12.11 SCREEN FOR COLON CANCER: ICD-10-CM

## 2020-07-13 DIAGNOSIS — N89.8 VAGINAL IRRITATION: ICD-10-CM

## 2020-07-13 DIAGNOSIS — M79.89 BILATERAL HAND SWELLING: ICD-10-CM

## 2020-07-13 DIAGNOSIS — Z00.00 ROUTINE GENERAL MEDICAL EXAMINATION AT A HEALTH CARE FACILITY: Primary | ICD-10-CM

## 2020-07-13 LAB
ALBUMIN SERPL-MCNC: 4.2 G/DL (ref 3.4–5)
ALP SERPL-CCNC: 83 U/L (ref 40–150)
ALT SERPL W P-5'-P-CCNC: 32 U/L (ref 0–50)
AST SERPL W P-5'-P-CCNC: 23 U/L (ref 0–45)
BILIRUB DIRECT SERPL-MCNC: <0.1 MG/DL (ref 0–0.2)
BILIRUB SERPL-MCNC: 0.6 MG/DL (ref 0.2–1.3)
CHOLEST SERPL-MCNC: 282 MG/DL
CRP SERPL-MCNC: <2.9 MG/L (ref 0–8)
ERYTHROCYTE [SEDIMENTATION RATE] IN BLOOD BY WESTERGREN METHOD: 9 MM/H (ref 0–30)
GLUCOSE SERPL-MCNC: 100 MG/DL (ref 70–99)
HDLC SERPL-MCNC: 57 MG/DL
LDLC SERPL CALC-MCNC: 191 MG/DL
NONHDLC SERPL-MCNC: 225 MG/DL
PROT SERPL-MCNC: 7.6 G/DL (ref 6.8–8.8)
TRIGL SERPL-MCNC: 170 MG/DL

## 2020-07-13 PROCEDURE — 86200 CCP ANTIBODY: CPT | Performed by: NURSE PRACTITIONER

## 2020-07-13 PROCEDURE — 86140 C-REACTIVE PROTEIN: CPT | Performed by: NURSE PRACTITIONER

## 2020-07-13 PROCEDURE — 87624 HPV HI-RISK TYP POOLED RSLT: CPT | Performed by: NURSE PRACTITIONER

## 2020-07-13 PROCEDURE — 82947 ASSAY GLUCOSE BLOOD QUANT: CPT | Performed by: NURSE PRACTITIONER

## 2020-07-13 PROCEDURE — G0145 SCR C/V CYTO,THINLAYER,RESCR: HCPCS | Performed by: NURSE PRACTITIONER

## 2020-07-13 PROCEDURE — 99213 OFFICE O/P EST LOW 20 MIN: CPT | Mod: 25 | Performed by: NURSE PRACTITIONER

## 2020-07-13 PROCEDURE — 85652 RBC SED RATE AUTOMATED: CPT | Performed by: NURSE PRACTITIONER

## 2020-07-13 PROCEDURE — 80061 LIPID PANEL: CPT | Performed by: NURSE PRACTITIONER

## 2020-07-13 PROCEDURE — 80076 HEPATIC FUNCTION PANEL: CPT | Performed by: NURSE PRACTITIONER

## 2020-07-13 PROCEDURE — 73130 X-RAY EXAM OF HAND: CPT | Mod: LT

## 2020-07-13 PROCEDURE — 36415 COLL VENOUS BLD VENIPUNCTURE: CPT | Performed by: NURSE PRACTITIONER

## 2020-07-13 PROCEDURE — 99396 PREV VISIT EST AGE 40-64: CPT | Performed by: NURSE PRACTITIONER

## 2020-07-13 PROCEDURE — 86431 RHEUMATOID FACTOR QUANT: CPT | Performed by: NURSE PRACTITIONER

## 2020-07-13 RX ORDER — ACYCLOVIR 200 MG/1
200-400 CAPSULE ORAL
Qty: 25 CAPSULE | Refills: 1 | Status: SHIPPED | OUTPATIENT
Start: 2020-07-13 | End: 2022-03-24

## 2020-07-13 RX ORDER — METRONIDAZOLE 7.5 MG/G
GEL TOPICAL
Qty: 30 G | Refills: 1 | Status: SHIPPED | OUTPATIENT
Start: 2020-07-13

## 2020-07-13 RX ORDER — GINSENG 100 MG
50 CAPSULE ORAL DAILY
COMMUNITY

## 2020-07-13 RX ORDER — VIT C/B6/B5/MAGNESIUM/HERB 173 50-5-6-5MG
500 CAPSULE ORAL DAILY
COMMUNITY

## 2020-07-13 ASSESSMENT — ENCOUNTER SYMPTOMS
ALLERGIC/IMMUNOLOGIC NEGATIVE: 1
EYES NEGATIVE: 1
HEMATOLOGIC/LYMPHATIC NEGATIVE: 1
RESPIRATORY NEGATIVE: 1
NEUROLOGICAL NEGATIVE: 1
CONSTITUTIONAL NEGATIVE: 1
ENDOCRINE NEGATIVE: 1
GASTROINTESTINAL NEGATIVE: 1
CARDIOVASCULAR NEGATIVE: 1
MUSCULOSKELETAL NEGATIVE: 1
PSYCHIATRIC NEGATIVE: 1

## 2020-07-13 ASSESSMENT — MIFFLIN-ST. JEOR: SCORE: 1393.47

## 2020-07-13 NOTE — LETTER
July 14, 2020      Maiakarson DUPREE Rafat  [address]  [place] [address]          Dear ,    We are writing to inform you of your test results.  Xray shows Arthritis   Please follow up as recommended by Your provider       Resulted Orders   XR Hand Bilateral G/E 3 Views    Narrative    HAND BILATERAL THREE OR MORE VIEWS July 13, 2020 11:11 AM     HISTORY: Bilateral hand swelling.    COMPARISON: None.      Impression    IMPRESSION:     Right hand: No acute bony abnormality. 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 No periarticular erosions, periarticular  osteopenia, subluxations or capsular swelling to indicate  synovial-based arthritis.    Left hand: No acute bony abnormality. 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 No periarticular erosions, periarticular  osteopenia, subluxations or capsular swelling to indicate  synovial-based arthritis.    MAURI MITCHELL MD       If you have any questions or concerns, please call the clinic at the number listed above.       Sincerely,        Lawanda Freed MD

## 2020-07-13 NOTE — LETTER
July 22, 2020    Shayne Douglass  [address] [place] [address]    Dear ,  This letter is regarding your recent Pap smear (cervical cancer screening) and Human Papillomavirus (HPV) test.  We are happy to inform you that your Pap smear result is normal. Cervical cancer is closely linked with certain types of HPV. Your results showed no evidence of high-risk HPV.  We recommend you have your next PAP smear and HPV test in 5 years.  You will still need to return to the clinic every year for an annual exam and other preventive tests.  If you have additional questions regarding this result, please call our registered nurse, Shanna at [phone].  Sincerely,    HAYLEY Coello CNP //justin

## 2020-07-13 NOTE — PATIENT INSTRUCTIONS
Consider getting the Shingrix vaccine to prevent shingles.  Please check with your insurance to see if this is best covered at the pharmacy or at a clinic visit.  You can walk into any pharmacy and get the shot without a prescription.  You may also schedule a nurse only visit to have the shot given at the clinic.    Preventive Health Recommendations  Female Ages 50 - 64    Yearly exam: See your health care provider every year in order to  o Review health changes.   o Discuss preventive care.    o Review your medicines if your doctor has prescribed any.      Get a Pap test every three years (unless you have an abnormal result and your provider advises testing more often).    If you get Pap tests with HPV test, you only need to test every 5 years, unless you have an abnormal result.     You do not need a Pap test if your uterus was removed (hysterectomy) and you have not had cancer.    You should be tested each year for STDs (sexually transmitted diseases) if you're at risk.     Have a mammogram every 1 to 2 years.    Have a colonoscopy at age 50, or have a yearly FIT test (stool test). These exams screen for colon cancer.      Have a cholesterol test every 5 years, or more often if advised.    Have a diabetes test (fasting glucose) every three years. If you are at risk for diabetes, you should have this test more often.     If you are at risk for osteoporosis (brittle bone disease), think about having a bone density scan (DEXA).    Shots: Get a flu shot each year. Get a tetanus shot every 10 years.    Nutrition:     Eat at least 5 servings of fruits and vegetables each day.    Eat whole-grain bread, whole-wheat pasta and brown rice instead of white grains and rice.    Get adequate Calcium and Vitamin D.     Lifestyle    Exercise at least 150 minutes a week (30 minutes a day, 5 days a week). This will help you control your weight and prevent disease.    Limit alcohol to one drink per day.    No smoking.     Wear  sunscreen to prevent skin cancer.     See your dentist every six months for an exam and cleaning.    See your eye doctor every 1 to 2 years.

## 2020-07-13 NOTE — PROGRESS NOTES
SUBJECTIVE:   CC: Shayne Douglass is an 65 year old woman who presents for preventive health visit.     Healthy Habits:    Getting at least 3 servings of Calcium per day:  Yes    Bi-annual eye exam:  Yes    Dental care twice a year:  Yes    Sleep apnea or symptoms of sleep apnea:  None    Diet:  Regular (no restrictions)    Frequency of exercise:  4-5 days/week    Duration of exercise:  30-45 minutes    Taking medications regularly:  No    Barriers to taking medications:  None    Medication side effects:  None    PHQ-2 Total Score:    Additional concerns today:  Yes (swollen hands)      Patient notes swelling to several joints on both hands and pain as well.  She has seen rheumatology twice for her symptoms, but notes that joints appear to be worsening.  She would like to be able to keep golfing.  She denies family history of rheumatoid arthritis.    Today's PHQ-2 Score:   PHQ-2 ( 1999 Pfizer) 7/13/2020   Q1: Little interest or pleasure in doing things 0   Q2: Feeling down, depressed or hopeless 0   PHQ-2 Score 0   Q1: Little interest or pleasure in doing things -   Q2: Feeling down, depressed or hopeless -   PHQ-2 Score -       Abuse: Current or Past(Physical, Sexual or Emotional)- No  Do you feel safe in your environment? Yes    Have you ever done Advance Care Planning? (For example, a Health Directive, POLST, or a discussion with a medical provider or your loved ones about your wishes): No, advance care planning information given to patient to review.  Patient declined advance care planning discussion at this time.    Social History     Tobacco Use     Smoking status: Never Smoker     Smokeless tobacco: Never Used   Substance Use Topics     Alcohol use: Yes     Comment: 20 drinks per week     If you drink alcohol do you typically have >3 drinks per day or >7 drinks per week? Yes    Alcohol Use 2/28/2018   Prescreen: >3 drinks/day or >7 drinks/week? -   AUDIT SCORE  15     AUDIT - Alcohol Use Disorders  Identification Test - Reproduced from the World Health Organization Audit 2001 (Second Edition) 2/28/2018   1.  How often do you have a drink containing alcohol? 4 or more times a week   2.  How many drinks containing alcohol do you have on a typical day when you are drinking? 1 or 2   3.  How often do you have five or more drinks on one occasion? Monthly   4.  How often during the last year have you found that you were not able to stop drinking once you had started? Monthly   5.  How often during the last year have you failed to do what was normally expected of you because of drinking? Less than monthly   6.  How often during the last year have you needed a first drink in the morning to get yourself going after a heavy drinking session? Never   7.  How often during the last year have you had a feeling of guilt or remorse after drinking? Less than monthly   8.  How often during the last year have you been unable to remember what happened the night before because of your drinking? Less than monthly   9.  Have you or someone else been injured because of your drinking? No   10. Has a relative, friend, doctor or other health care worker been concerned about your drinking or suggested you cut down? Yes, during the last year   TOTAL SCORE 15       Reviewed orders with patient.  Reviewed health maintenance and updated orders accordingly - Yes  Lab work is in process  BP Readings from Last 3 Encounters:   07/13/20 106/82   06/06/19 131/83   05/02/19 126/81    Wt Readings from Last 3 Encounters:   07/13/20 82 kg (180 lb 12.8 oz)   07/05/18 79.8 kg (176 lb)   05/01/18 79.5 kg (175 lb 3.2 oz)                  Patient Active Problem List   Diagnosis     Family hx of colon cancer     Recurrent cold sores     Vitamin D deficiency     Alcohol problem drinking     Abnormal Pap smear of cervix     Bunion     Hyperlipidemia LDL goal <160     Advanced directives, counseling/discussion     Localized osteoarthrosis, shoulder region      Scapular dyskinesis     Impingement syndrome, shoulder     Pain in joint, shoulder region     Family history of breast cancer in sister     Chronic low back pain with sciatica, sciatica laterality unspecified, unspecified back pain laterality     Alcoholism /alcohol abuse (H)     Past Surgical History:   Procedure Laterality Date     NO HISTORY OF SURGERY       SLING TRANSVAGINAL N/A 5/7/2018    Procedure: SLING TRANSVAGINAL;  Pubovaginal sling([place]);  Surgeon: Ras White MD;  Location:  OR       Social History     Tobacco Use     Smoking status: Never Smoker     Smokeless tobacco: Never Used   Substance Use Topics     Alcohol use: Yes     Comment: 20 drinks per week     Family History   Problem Relation Age of Onset     Diabetes Father      Cardiovascular Father         COPD     Cancer - colorectal Mother      Breast Cancer Sister         Ductal Sarcoma- stage 4     Hypertension No family hx of          Current Outpatient Medications   Medication Sig Dispense Refill     acyclovir (ZOVIRAX) 200 MG capsule Take 1-2 capsules (200-400 mg) by mouth 5 times daily TAKE AS DIRECTED FOR COLD SORES 25 capsule 1     Cholecalciferol (VITAMIN D-3 PO) Take 4,000 mg by mouth.       fish oil-omega-3 fatty acids (FISH OIL) 1000 MG capsule ONCE DAILY       metroNIDAZOLE (METROCREAM) 0.75 % external cream Apply twice daily to the face for rosacea. 45 g 6     metroNIDAZOLE (METROGEL) 0.75 % topical gel Apply topically daily as needed. 30 g 0     Milk Thistle 175 MG TABS Take 175 mg by mouth daily       multivitamin w/minerals (MULTI-VITAMIN) tablet Take 1 tablet by mouth daily.       Turmeric (QC TUMERIC COMPLEX) 500 MG CAPS Take 500 mg by mouth daily       zinc 50 MG TABS Take 50 mg by mouth daily        ascorbic acid (VITAMIN C) 1000 MG TABS Take 1,000 mg by mouth daily.       Calcium Carbonate-Vit D-Min (CALCIUM 1200 PO) Take  by mouth daily.       MAGNESIUM PO Take  by mouth.       No Known Allergies    Mammogram  "Screening: Patient over age 50, mutual decision to screen reflected in health maintenance.    Pertinent mammograms are reviewed under the imaging tab.  History of abnormal Pap smear:   YES - updated in Problem List and Health Maintenance accordingly  Last 3 Pap and HPV Results:   PAP / HPV 5/25/2016 2/15/2013 2/15/2012   PAP NIL NIL NIL     PAP / HPV 5/25/2016 2/15/2013 2/15/2012   PAP NIL NIL NIL     Reviewed and updated as needed this visit by clinical staff  Tobacco  Allergies  Meds  Med Hx  Surg Hx  Fam Hx  Soc Hx        Reviewed and updated as needed this visit by Provider            Review of Systems   Constitutional: Negative.    HENT: Negative.    Eyes: Negative.    Respiratory: Negative.    Cardiovascular: Negative.    Gastrointestinal: Negative.    Endocrine: Negative.    Genitourinary: Negative.    Musculoskeletal: Negative.    Skin: Negative.    Allergic/Immunologic: Negative.    Neurological: Negative.    Hematological: Negative.    Psychiatric/Behavioral: Negative.           OBJECTIVE:   /82   Pulse 70   Temp 97.1  F (36.2  C) (Oral)   Resp 14   Ht 1.679 m (5' 6.1\")   Wt 82 kg (180 lb 12.8 oz)   SpO2 98%   BMI 29.09 kg/m    Physical Exam  Vitals signs and nursing note reviewed.   Constitutional:       General: She is not in acute distress.     Appearance: She is well-developed.   HENT:      Head: Normocephalic and atraumatic.      Right Ear: External ear normal.      Left Ear: External ear normal.   Eyes:      General: No scleral icterus.     Conjunctiva/sclera: Conjunctivae normal.      Pupils: Pupils are equal, round, and reactive to light.   Neck:      Musculoskeletal: Normal range of motion and neck supple.      Thyroid: No thyromegaly.      Vascular: No carotid bruit.   Cardiovascular:      Rate and Rhythm: Normal rate and regular rhythm.      Heart sounds: Normal heart sounds. No murmur. No friction rub.   Pulmonary:      Effort: Pulmonary effort is normal. No respiratory " distress.      Breath sounds: Normal breath sounds.   Chest:      Breasts:         Right: Normal. No mass or nipple discharge.         Left: Normal. No mass or nipple discharge.   Abdominal:      General: Bowel sounds are normal.      Palpations: Abdomen is soft. There is no mass.      Tenderness: There is no abdominal tenderness.      Hernia: There is no hernia in the left inguinal area or right inguinal area.   Genitourinary:     General: Normal vulva.      Exam position: Lithotomy position.      Pubic Area: No rash.       Labia:         Right: No rash or lesion.         Left: No rash or lesion.       Urethra: No prolapse.      Vagina: Normal.      Cervix: Normal.      Uterus: Normal.       Adnexa: Right adnexa normal and left adnexa normal.   Musculoskeletal: Normal range of motion.         General: No deformity.      Right hand: She exhibits swelling. Deformity: ulnar deviation.      Left hand: She exhibits swelling. Deformity: ulnar deviation.   Lymphadenopathy:      Cervical: No cervical adenopathy.      Upper Body:      Right upper body: No supraclavicular, axillary or pectoral adenopathy.      Left upper body: No supraclavicular, axillary or pectoral adenopathy.      Lower Body: No right inguinal adenopathy. No left inguinal adenopathy.   Skin:     General: Skin is warm and dry.      Findings: No rash.   Neurological:      Mental Status: She is alert and oriented to person, place, and time.   Psychiatric:         Behavior: Behavior normal.         Thought Content: Thought content normal.         Judgment: Judgment normal.         Diagnostic Test Results:  IN process    ASSESSMENT/PLAN:   1. Routine general medical examination at a health care facility    - Lipid panel reflex to direct LDL Fasting  - Glucose    2. Recurrent cold sores  Refill requested.   - acyclovir (ZOVIRAX) 200 MG capsule; Take 1-2 capsules (200-400 mg) by mouth 5 times daily TAKE AS DIRECTED FOR COLD SORES  Dispense: 25 capsule; Refill:  "1    3. Rosacea  Refill requested.   - metroNIDAZOLE (METROCREAM) 0.75 % external cream; Apply twice daily to the face for rosacea.  Dispense: 45 g; Refill: 6    4. Vaginal irritation  Refill requested.   - metroNIDAZOLE (METROGEL) 0.75 % external gel; Apply topically daily as needed.  Dispense: 30 g; Refill: 1    5. Screening for cervical cancer    - Pap imaged thin layer screen with HPV - recommended age 30 - 65 years (select HPV order below)  - HPV High Risk Types DNA Cervical    6. Screen for colon cancer    - GASTROENTEROLOGY ADULT REF PROCEDURE ONLY; Future    7. Alcoholism /alcohol abuse (H)  Patient declines resources for assistance with cessation.  We did discuss a trial of naltrexone to decrease consumption, but patient declines.  - Hepatic panel    8. Bilateral hand swelling    - ESR: Erythrocyte sedimentation rate  - CRP, inflammation  - Rheumatoid factor  - Cyclic Citrullinated Peptide Antibody IgG  - XR Hand Bilateral G/E 3 Views; Future    COUNSELING:  Reviewed preventive health counseling, as reflected in patient instructions       Regular exercise       Healthy diet/nutrition       Alcohol Use    Estimated body mass index is 29.09 kg/m  as calculated from the following:    Height as of this encounter: 1.679 m (5' 6.1\").    Weight as of this encounter: 82 kg (180 lb 12.8 oz).    Weight management plan: Discussed healthy diet and exercise guidelines     reports that she has never smoked. She has never used smokeless tobacco.      Counseling Resources:  ATP IV Guidelines  Pooled Cohorts Equation Calculator  Breast Cancer Risk Calculator  FRAX Risk Assessment  ICSI Preventive Guidelines  Dietary Guidelines for Americans, 2010  USDA's MyPlate  ASA Prophylaxis  Lung CA Screening    HAYLEY Coello CNP  [facility]  "

## 2020-07-13 NOTE — LETTER
July 15, 2020      Shayne Douglass  [address] [place] [address]        Dear ,    We are writing to inform you of your test results.  Her fasting glucose is just elevated into the pre-diabetes range.  We'll continue to monitor this yearly.  If patient is interested in attending a pre-diabetes class, please place diabetic ed referral (indication impaired fasting glucose).       Her cholesterol has elevated as well.  I would recommend starting treatment with atorvastatin 10 mg daily to help lower her LDL cholesterol (#90, 3 RF, indication hyperlipidemia) and repeating a fasting lipid panel again in 3 months.       Her other labs are normal and do not show signs of rheumatoid arthritis.  Her x-rays do not as well.  Would she want to meet with a hand specialist to see if they have any further advice regarding her arthritis?  (orthopedics referral, indication bilateral hand osteoarthritis).      Resulted Orders   Lipid panel reflex to direct LDL Fasting   Result Value Ref Range    Cholesterol 282 (H) <200 mg/dL      Comment:      Desirable:       <200 mg/dl    Triglycerides 170 (H) <150 mg/dL      Comment:      Borderline high:  150-199 mg/dl  High:             200-499 mg/dl  Very high:       >499 mg/dl      HDL Cholesterol 57 >49 mg/dL    LDL Cholesterol Calculated 191 (H) <100 mg/dL      Comment:      Above desirable:  100-129 mg/dl  Borderline High:  130-159 mg/dL  High:             160-189 mg/dL  Very high:       >189 mg/dl      Non HDL Cholesterol 225 (H) <130 mg/dL      Comment:      Above Desirable:  130-159 mg/dl  Borderline high:  160-189 mg/dl  High:             190-219 mg/dl  Very high:       >219 mg/dl     Glucose   Result Value Ref Range    Glucose 100 (H) 70 - 99 mg/dL   Hepatic panel   Result Value Ref Range    Bilirubin Direct <0.1 0.0 - 0.2 mg/dL    Bilirubin Total 0.6 0.2 - 1.3 mg/dL    Albumin 4.2 3.4 - 5.0 g/dL    Protein Total 7.6 6.8 - 8.8 g/dL    Alkaline Phosphatase 83 40  - 150 U/L    ALT 32 0 - 50 U/L    AST 23 0 - 45 U/L   ESR: Erythrocyte sedimentation rate   Result Value Ref Range    Sed Rate 9 0 - 30 mm/h   CRP, inflammation   Result Value Ref Range    CRP Inflammation <2.9 0.0 - 8.0 mg/L   Rheumatoid factor   Result Value Ref Range    Rheumatoid Factor <7 <12 IU/mL   Cyclic Citrullinated Peptide Antibody IgG   Result Value Ref Range    Cyclic Citrullinated Peptide Antibody, IgG 1 <7 U/mL      Comment:      Negative       If you have any questions or concerns, please call the clinic at the number listed above.       Sincerely,        HAYLEY Coello CNP/blt

## 2020-07-13 NOTE — LETTER
July 16, 2020      Shayne Douglass  [address] [place] [address]          Dear ,    We are writing to inform you of your test results.  Your x-ray shows osteoarthritis to you fingers.  No signs of inflammatory arthritis present.       Resulted Orders   XR Hand Bilateral G/E 3 Views    Narrative    HAND BILATERAL THREE OR MORE VIEWS July 13, 2020 11:11 AM     HISTORY: Bilateral hand swelling.    COMPARISON: None.      Impression    IMPRESSION:     Right hand: No acute bony abnormality. 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 No periarticular erosions, periarticular  osteopenia, subluxations or capsular swelling to indicate  synovial-based arthritis.    Left hand: No acute bony abnormality. 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 No periarticular erosions, periarticular  osteopenia, subluxations or capsular swelling to indicate  synovial-based arthritis.    MAURI MITCHELL MD       If you have any questions or concerns, please call the clinic at the number listed above.       Sincerely,        Sol Anderson, CNP

## 2020-07-14 LAB
CCP AB SER IA-ACNC: 1 U/ML
RHEUMATOID FACT SER NEPH-ACNC: <7 IU/ML (ref 0–20)

## 2020-07-14 NOTE — RESULT ENCOUNTER NOTE
Dear Shayne,    Your recent test results are attached.      Your x-ray shows osteoarthritis to you fingers.  No signs of inflammatory arthritis present.    If you have any questions please feel free to contact [phone] or myself via [employer].    Sincerely,  Sol Anderson, CNP

## 2020-07-15 DIAGNOSIS — M19.042 OSTEOARTHRITIS OF BOTH HANDS: ICD-10-CM

## 2020-07-15 DIAGNOSIS — M19.041 OSTEOARTHRITIS OF BOTH HANDS: ICD-10-CM

## 2020-07-15 DIAGNOSIS — R73.03 PREDIABETES: Primary | ICD-10-CM

## 2020-07-15 DIAGNOSIS — E78.5 HYPERLIPIDEMIA: ICD-10-CM

## 2020-07-17 LAB
COPATH REPORT: NORMAL
PAP: NORMAL

## 2020-07-20 LAB
FINAL DIAGNOSIS: NORMAL
HPV HR 12 DNA CVX QL NAA+PROBE: NEGATIVE
HPV16 DNA SPEC QL NAA+PROBE: NEGATIVE
HPV18 DNA SPEC QL NAA+PROBE: NEGATIVE
SPECIMEN DESCRIPTION: NORMAL
SPECIMEN SOURCE CVX/VAG CYTO: NORMAL

## 2020-09-02 ENCOUNTER — TELEPHONE (OUTPATIENT)
Dept: FAMILY MEDICINE | Facility: CLINIC | Age: 65
End: 2020-09-02

## 2020-09-02 NOTE — TELEPHONE ENCOUNTER
Diabetes Education Scheduling Outreach #1:    Call to patient to schedule. Patient would like to hold off on scheduling for now due to insurance reasons. Message sent to CDE to see if materials can be sent to patient, per patient request.    Tanya Hopper On[facility]  Diabetes and Nutrition Scheduling

## 2020-09-03 ENCOUNTER — TRANSFERRED RECORDS (OUTPATIENT)
Dept: HEALTH INFORMATION MANAGEMENT | Facility: CLINIC | Age: 65
End: 2020-09-03

## 2020-10-05 ENCOUNTER — VIRTUAL VISIT (OUTPATIENT)
Dept: FAMILY MEDICINE | Facility: OTHER | Age: 65
End: 2020-10-05
Payer: COMMERCIAL

## 2020-10-05 ENCOUNTER — NURSE TRIAGE (OUTPATIENT)
Dept: NURSING | Facility: CLINIC | Age: 65
End: 2020-10-05

## 2020-10-05 PROCEDURE — 99421 OL DIG E/M SVC 5-10 MIN: CPT | Performed by: PHYSICIAN ASSISTANT

## 2020-10-05 NOTE — TELEPHONE ENCOUNTER
Cold sx    Blowing nose  Sore throatt  Cough  No fever.  No fatigue    COVID 19 Nurse Triage Plan/Patient Instructions    Please be aware that novel coronavirus (COVID-19) may be circulating in the community. If you develop symptoms such as fever, cough, or SOB or if you have concerns about the presence of another infection including coronavirus (COVID-19), please contact your health care provider or visit [email or web address].     Disposition/Instructions    Home care recommended. Follow home care protocol based instructions.  Virtual Visit with provider recommended. Reference Visit Selection Guide.    Thank you for taking steps to prevent the spread of this virus.  o Limit your contact with others.  o Wear a simple mask to cover your cough.  o Wash your hands well and often.    Resources     [facility]: About COVID-19: [email or web address]    CDC: What to Do If You're Sick: www.cdc.gov/coronavirus/2019-ncov/about/steps-when-sick.html    CDC: Ending Home Isolation: www.cdc.gov/coronavirus/2019-ncov/hcp/disposition-in-home-patients.html     CDC: Caring for Someone: www.cdc.gov/coronavirus/2019-ncov/if-you-are-sick/care-for-someone.html     [facility]: Interim Guidance for Hospital Discharge to Home: [email or web address]/hcp/[email or web address].pdf    [facility] clinical trials (COVID-19 research studies): [email or web address]-clinical-trials     Below are the COVID-19 hotlines at the [facility] of Health ([facility]). Interpreters are available.   o For health questions: Call 990-468-0011 or 1-359.973.8305 (7 a.m. to 7 p.m.)  o For questions about schools and childcare: Call 926-741-4954 or 1-622.967.4512 (7 a.m. to 7 p.m.)     Reason for Disposition    COVID-19 Testing, questions about    [1] Caller concerned that exposure to COVID-19 occurred BUT [2] does not meet COVID-19 EXPOSURE criteria from CDC    Additional Information    Negative: COVID-19 has been diagnosed by a  healthcare provider (HCP)    Negative: COVID-19 lab test positive    Negative: [1] Symptoms of COVID-19 (e.g., cough, fever, SOB, or others) AND [2] lives in an area with community spread    Negative: [1] Symptoms of COVID-19 (e.g., cough, fever, SOB, or others) AND [2] within 14 days of EXPOSURE (close contact) with diagnosed or suspected COVID-19 patient    Negative: [1] Symptoms of COVID-19 (e.g., cough, fever, SOB, or others) AND [2] within 14 days of travel from high-risk area for COVID-19 community spread (identified by CDC)    Negative: [1] Difficulty breathing (shortness of breath) occurs AND [2] onset > 14 days after COVID-19 EXPOSURE (Close Contact) AND [3] no community spread where patient lives    Negative: [1] Dry cough occurs AND [2] onset > 14 days after COVID-19 EXPOSURE AND [3] no community spread where patient lives    Negative: [1] Wet cough (i.e., white-yellow, yellow, green, or luis colored sputum) AND [2] onset > 14 days after COVID-19 EXPOSURE AND [3] no community spread where patient lives    Negative: [1] Common cold symptoms AND [2] onset > 14 days after COVID-19 EXPOSURE AND [3] no community spread where patient lives    Negative: [1] COVID-19 EXPOSURE (Close Contact) within last 14 days AND [2] needs COVID-19 lab test to return to work AND [3] NO symptoms    Negative: [1] COVID-19 EXPOSURE (Close Contact) within last 14 days AND [2] exposed person is a healthcare worker who was NOT using all recommended personal protective equipment (i.e., a respirator-N95 mask, eye protection, gloves, and gown) AND [3] NO symptoms    Protocols used: CORONAVIRUS (COVID-19) EXPOSURE-A- 8.4.20

## 2020-10-05 NOTE — PROGRESS NOTES
"Date: 10/05/2020 09:44:44  Clinician: Rajesh Gramajo  Clinician NPI: [id number]  Patient: Kosta Douglass  Patient : 1955  Patient Address: [address] [place] [address] [place] [address]  Patient Phone: [phone]  Visit Protocol: URI  Patient Summary:  Kosta is a 65 year old ( : 1955 ) female who initiated a [facility] Visit for COVID-19 (Coronavirus) evaluation and screening. When asked the question \"Please sign me up to receive news, health information and promotions from [facility].\", Kosta responded \"No\".    Kosta states her symptoms started gradually 7-9 days ago. After her symptoms started, they improved and then got worse again.   Her symptoms consist of a cough, rhinitis, and malaise.   Symptom details     Nasal secretions: The color of her mucus is clear.    Cough: Kosta coughs every 5-10 minutes and her cough is not more bothersome at night. Phlegm does not come into her throat when she coughs. She does not believe her cough is caused by post-nasal drip.      Kosta denies having ear pain, headache, wheezing, fever, enlarged lymph nodes, nasal congestion, anosmia, vomiting, nausea, facial pain or pressure, myalgias, chills, sore throat, teeth pain, ageusia, and diarrhea. She also denies taking antibiotic medication in the past month and having recent facial or sinus surgery in the past 60 days. She is not experiencing dyspnea.   Precipitating events  She has recently been exposed to someone with influenza. Kosta has not been in close contact with any high risk individuals.   Pertinent COVID-19 (Coronavirus) information  In the past 14 days, Kosta has not worked in a congregate living setting.   She does not work or volunteer as healthcare worker or a  and does not work or volunteer in a healthcare facility.   Kosta also has not lived in a congregate living setting in the past 14 days. She does not live with a healthcare worker.   Kosta has had a close contact with a laboratory-confirmed COVID-19 " patient within 14 days of symptom onset. Additional information about contact with COVID-19 (Coronavirus) patient as reported by the patient (free text): Two friends that we ate dinner with and were in contact with for several hours on Sept. 26 and 27.  They got sick on Tues or Wed including a fever,  got tested on Wed.  the 30th and were confirmed to have Covid -19 on Oct.2nd.   Since December 2019, Kosta and has had upper respiratory infection (URI) or influenza-like illness. Has not been diagnosed with lab-confirmed COVID-19 test      Date(s) of previous URI or influenza-like illness (free-text): Sept 27 2020 to present     Symptoms Ksota experienced during previous URI or influenza-like illness as reported by the patient (free-text): Slight cold, running nose, slight cough feeling slightly run down        Pertinent medical history  Kosta does not get yeast infections when she takes antibiotics.   Kosta does not need a return to work/school note.   Weight: 180 lbs   Kosta does not smoke or use smokeless tobacco.   Additional information as reported by the patient (free text): Normally I would not ask to be tested  if I hadn't been in close contact with COVID-19.  Upcoming events including airplane travel for people I have been with since is causing me to be extra careful as I don't want them to needlessly cancel their flight if I test negative.   Weight: 180 lbs    MEDICATIONS: No current medications, ALLERGIES: NKDA  Clinician Response:  Dear Kosta,    Your symptoms show that you may have coronavirus (COVID-19). This illness can cause fever, cough and trouble breathing. Many people get a mild case and get better on their own. Some people can get very sick.  What should I do?  We would like to test you for this virus.   1. Please call [phone] to schedule your visit. Explain that you were referred by [facility] to have a COVID-19 test. Be ready to share your [facility] visit ID number.  The following will serve as your  "written order for this COVID Test, ordered by me, for the indication of suspected COVID [Z20.828]: The test will be ordered in "[employer] our electronic health record, after you are scheduled. It will show as ordered and authorized by Boogie Flanagan MD.  Order: COVID-19 (Coronavirus) PCR [facility]TOMATIC [facility]      2. When it's time for your COVID test:  Stay at least 6 feet away from others. (If someone will drive you to your test, stay in the backseat, as far away from the  as you can.)   Cover your mouth and nose with a mask, tissue or washcloth.  Go straight to the testing site. Don't make any stops on the way there or back.      3.Starting now: Stay home and away from others (self-isolate) until:   You've had no fever---and no medicine that reduces fever---for one full day (24 hours). And...   Your other symptoms have gotten better. For example, your cough or breathing has improved. And...   At least 10 days have passed since your symptoms started.       During this time, don't leave the house except for testing or medical care.   Stay in your own room, even for meals. Use your own bathroom if you can.   Stay away from others in your home. No hugging, kissing or shaking hands. No visitors.  Don't go to work, school or anywhere else.    Clean \"high touch\" surfaces often (doorknobs, counters, handles, etc.). Use a household cleaning spray or wipes. You'll find a full list of  on the EPA website: www.epa.gov/pesticide-registration/list-n-disinfectants-use-against-sars-cov-2.   Cover your mouth and nose with a mask, tissue or washcloth to avoid spreading germs.  Wash your hands and face often. Use soap and water.  Caregivers in these groups are at risk for severe illness due to COVID-19:  o People 65 years and older  o People who live in a nursing home or long-term care facility  o People with chronic disease (lung, heart, cancer, diabetes, kidney, liver, immunologic)  o People who have a " weakened immune system, including those who:   Are in cancer treatment  Take medicine that weakens the immune system, such as corticosteroids  Had a bone marrow or organ transplant  Have an immune deficiency  Have poorly controlled HIV or AIDS  Are obese (body mass index of 40 or higher)  Smoke regularly   o Caregivers should wear gloves while washing dishes, handling laundry and cleaning bedrooms and bathrooms.  o Use caution when washing and drying laundry: Don't shake dirty laundry, and use the warmest water setting that you can.  o For more tips, go to www.cdc.gov/coronavirus/2019-ncov/downloads/10Things.pdf.    How can I take care of myself?    Get lots of rest. Drink extra fluids (unless a doctor has told you not to).   Take Tylenol (acetaminophen) for fever or pain. If you have liver or kidney problems, ask your family doctor if it's okay to take Tylenol.   Adults can take either:    650 mg (two 325 mg pills) every 4 to 6 hours, or...   1,000 mg (two 500 mg pills) every 8 hours as needed.    Note: Don't take more than 3,000 mg in one day. Acetaminophen is found in many medicines (both prescribed and over-the-counter medicines). Read all labels to be sure you don't take too much.   For children, check the Tylenol bottle for the right dose. The dose is based on the child's age or weight.    If you have other health problems (like cancer, heart failure, an organ transplant or severe kidney disease): Call your specialty clinic if you don't feel better in the next 2 days.       Know when to call 911. Emergency warning signs include:    Trouble breathing or shortness of breath Pain or pressure in the chest that doesn't go away Feeling confused like you haven't felt before, or not being able to wake up Bluish-colored lips or face.  Where can I get more information?    [facility] [facility] -- About COVID-19: [email or web address]   CDC -- What to Do If You're Sick:  www.cdc.gov/coronavirus/2019-ncov/about/steps-when-sick.html   CDC -- Ending Home Isolation: www.cdc.gov/coronavirus/2019-ncov/hcp/disposition-in-home-patients.html   [facility] -- Caring for Someone: www.cdc.gov/coronavirus/2019-ncov/if-you-are-sick/care-for-someone.html   [facility] -- Interim Guidance for Hospital Discharge to Home: www.[facility].[place]./diseases/coronavirus/hcp/hospdischarge.pdf   [facility] clinical trials (COVID-19 research studies): clinicalaffairs.[facility]-clinical-trials    Below are the COVID-19 hotlines at the [place] [facility] ([facility]). Interpreters are available.    For health questions: Call 584-653-5615 or 1-155.711.8213 (7 a.m. to 7 p.m.) For questions about schools and childcare: Call 469-648-8679 or 1-309.645.5766 (7 a.m. to 7 p.m.)  7 p.m.)        Diagnosis: Other malaise  Diagnosis ICD: R53.81

## 2020-10-07 ENCOUNTER — NURSE TRIAGE (OUTPATIENT)
Dept: NURSING | Facility: CLINIC | Age: 65
End: 2020-10-07

## 2020-10-07 NOTE — TELEPHONE ENCOUNTER
Shayne is calling to cancel appointment for covid test as she got it else where.      COVID 19 Nurse Triage Plan/Patient Instructions    Please be aware that novel coronavirus (COVID-19) may be circulating in the community. If you develop symptoms such as fever, cough, or SOB or if you have concerns about the presence of another infection including coronavirus (COVID-19), please contact your health care provider or visit [email or web address].     Disposition/Instructions    Home care recommended. Follow home care protocol based instructions.    Thank you for taking steps to prevent the spread of this virus.  o Limit your contact with others.  o Wear a simple mask to cover your cough.  o Wash your hands well and often.    Resources    [facility]: About COVID-19: [email or web address]    CDC: What to Do If You're Sick: www.cdc.gov/coronavirus/2019-ncov/about/steps-when-sick.html    CDC: Ending Home Isolation: www.cdc.gov/coronavirus/2019-ncov/hcp/disposition-in-home-patients.html     CDC: Caring for Someone: www.cdc.gov/coronavirus/2019-ncov/if-you-are-sick/care-for-someone.html     [facility]: Interim Guidance for Hospital Discharge to Home: www.[facility].[place]/diseases/coronavirus/hcp/hospdischarge.pdf    [facility] clinical trials (COVID-19 research studies): clinicalaffairs.[facility]-clinical-trials     Below are the COVID-19 hotlines at the [facility] ([facility]). Interpreters are available.   o For health questions: Call 070-222-4736 or 1-789.502.3426 (7 a.m. to 7 p.m.)  o For questions about schools and childcare: Call 010-156-9091 or 1-291.886.4356 (7 a.m. to 7 p.m.)                       Additional Information    Caller is angry or rude (e.g., hangs up, verbally abusive, yelling)    Negative: Nursing judgment, per information in Reference    Negative: Information only call about a Well Adult (no illness or injury)    Negative: Nursing judgment or information in  reference    Negative: Nursing judgment or information in reference    Negative: Nursing judgment or information in reference    Negative: Nursing judgment or information in reference    Negative: Nursing judgment or information in reference    Negative: Nursing judgment or information in reference    Negative: Nursing judgment or information in reference    Negative: Nursing judgment or information in reference    Negative: Nursing judgment or information in reference    Negative: Nursing judgment or information in reference    Negative: Nursing judgment or information in reference    Negative: Nursing judgment or information in reference    Negative: Nursing judgment or information in reference    Nursing judgment or information in reference    Protocols used: NO CONTACT OR DUPLICATE CONTACT CALL-A-, NO GUIDELINE [id number]

## 2020-11-19 ENCOUNTER — OFFICE VISIT (OUTPATIENT)
Dept: FAMILY MEDICINE | Facility: CLINIC | Age: 65
End: 2020-11-19
Payer: COMMERCIAL

## 2020-11-19 VITALS
OXYGEN SATURATION: 97 % | TEMPERATURE: 97.7 F | HEART RATE: 75 BPM | WEIGHT: 181.2 LBS | BODY MASS INDEX: 29.16 KG/M2 | DIASTOLIC BLOOD PRESSURE: 78 MMHG | SYSTOLIC BLOOD PRESSURE: 130 MMHG

## 2020-11-19 DIAGNOSIS — H69.93 DYSFUNCTION OF BOTH EUSTACHIAN TUBES: Primary | ICD-10-CM

## 2020-11-19 DIAGNOSIS — H61.23 BILATERAL IMPACTED CERUMEN: ICD-10-CM

## 2020-11-19 PROCEDURE — 99213 OFFICE O/P EST LOW 20 MIN: CPT | Mod: 25 | Performed by: NURSE PRACTITIONER

## 2020-11-19 PROCEDURE — 69210 REMOVE IMPACTED EAR WAX UNI: CPT | Mod: 50 | Performed by: NURSE PRACTITIONER

## 2020-11-19 RX ORDER — UBIDECARENONE 100 MG
CAPSULE ORAL DAILY
COMMUNITY

## 2020-11-19 NOTE — PROCEDURES
Cerumenosis is noted.  Wax is removed by syringing and manual debridement. Instructions for home care to prevent wax buildup are given.    Sol Anderson, CNP

## 2020-11-19 NOTE — PROGRESS NOTES
Subjective     Shayne Douglass is a 65 year old female who presents to clinic today for the following health issues:    HPI          Chief Complaint   Patient presents with     Ear Problem     plugged ears       Patient was noted to have ceruminosis to her right ear when seen by her audiologist last week.  She notes plugging to her right ear.    Review of Systems   Constitutional, HEENT, cardiovascular, pulmonary, gi and gu systems are negative, except as otherwise noted.      Objective    /78   Pulse 75   Temp 97.7  F (36.5  C) (Oral)   Wt 82.2 kg (181 lb 3.2 oz)   SpO2 97%   BMI 29.16 kg/m    Body mass index is 29.16 kg/m .  Physical Exam   GENERAL: healthy, alert and no distress  HENT: normal cephalic/atraumatic, right ear: clear effusion and occluded with wax and left ear: clear effusion  RESP: lungs clear to auscultation - no rales, rhonchi or wheezes  CV: regular rate and rhythm, normal S1 S2, no S3 or S4, no murmur, click or rub, no peripheral edema and peripheral pulses strong  MS: no gross musculoskeletal defects noted, no edema            Assessment & Plan     Dysfunction of both eustachian tubes  Patient to use pseudoephedrine regularly for 1 weeks.  If no improvement, follow-up with ENT.    Bilateral impacted cerumen  Ear wash performed.  - REMOVE IMPACTED CERUMEN            Return in about 8 months (around 7/14/2021) for Physical Exam.    HAYLEY Coello CNP  [facility]

## 2020-12-02 ENCOUNTER — OFFICE VISIT (OUTPATIENT)
Dept: OTOLARYNGOLOGY | Facility: CLINIC | Age: 65
End: 2020-12-02
Payer: COMMERCIAL

## 2020-12-02 ENCOUNTER — OFFICE VISIT (OUTPATIENT)
Dept: AUDIOLOGY | Facility: CLINIC | Age: 65
End: 2020-12-02
Payer: COMMERCIAL

## 2020-12-02 DIAGNOSIS — H90.3 SNHL (SENSORY-NEURAL HEARING LOSS), ASYMMETRICAL: Primary | ICD-10-CM

## 2020-12-02 DIAGNOSIS — H93.8X3 PLUGGED FEELING IN EAR, BILATERAL: ICD-10-CM

## 2020-12-02 DIAGNOSIS — Z53.9 ERRONEOUS ENCOUNTER--DISREGARD: Primary | ICD-10-CM

## 2020-12-02 PROCEDURE — 99213 OFFICE O/P EST LOW 20 MIN: CPT | Performed by: OTOLARYNGOLOGY

## 2020-12-02 NOTE — PROGRESS NOTES
Chief Complaint - Hearing loss, plugging in ears    History of Present Illness - Shayne Douglass is a 65 year old female who presents to me today with hearing loss in the left ear. She notes chronic plugging or ETD in both ears. She had left-sided hearing loss that has been present and noticeable for approximately years. I saw her in 2016 when she had sudden onset sensorineural hearing loss left side. MRI brain was normal. She never followed-up. She has popping in ears when she would ride an elevator. She tries yawning, but they won't pop. No pain. She wears a hearing aid in the left. She goes to "[facility]. She has tried sudafed for 2 weeks. It hasn't helped. There is no history of chronic ear disease or ear surgery. She was told by [facility] that her hearing has declined a little in both ears.     Past Medical History -   Patient Active Problem List   Diagnosis     Family hx of colon cancer     Recurrent cold sores     Vitamin D deficiency     Alcohol problem drinking     Abnormal Pap smear of cervix     Bunion     Hyperlipidemia LDL goal <160     Advanced directives, counseling/discussion     Localized osteoarthrosis, shoulder region     Scapular dyskinesis     Impingement syndrome, shoulder     Pain in joint, shoulder region     Family history of breast cancer in sister     Chronic low back pain with sciatica, sciatica laterality unspecified, unspecified back pain laterality     Alcoholism /alcohol abuse (H)       Current Medications -   Current Outpatient Medications:      acyclovir (ZOVIRAX) 200 MG capsule, Take 1-2 capsules (200-400 mg) by mouth 5 times daily TAKE AS DIRECTED FOR COLD SORES, Disp: 25 capsule, Rfl: 1     ascorbic acid (VITAMIN C) 1000 MG TABS, Take 1,000 mg by mouth daily., Disp: , Rfl:      Calcium Carbonate-Vit D-Min (CALCIUM 1200 PO), Take  by mouth daily., Disp: , Rfl:      Cholecalciferol (VITAMIN D-3 PO), Take 4,000 mg by mouth., Disp: , Rfl:      co-enzyme Q-10 100 MG CAPS capsule,  Take by mouth daily, Disp: , Rfl:      fish oil-omega-3 fatty acids (FISH OIL) 1000 MG capsule, ONCE DAILY, Disp: , Rfl:      MAGNESIUM PO, Take  by mouth., Disp: , Rfl:      metroNIDAZOLE (METROCREAM) 0.75 % external cream, Apply twice daily to the face for rosacea., Disp: 45 g, Rfl: 6     metroNIDAZOLE (METROGEL) 0.75 % external gel, Apply topically daily as needed., Disp: 30 g, Rfl: 1     Milk Thistle 175 MG TABS, Take 175 mg by mouth daily, Disp: , Rfl:      multivitamin w/minerals (MULTI-VITAMIN) tablet, Take 1 tablet by mouth daily., Disp: , Rfl:      Turmeric (QC TUMERIC COMPLEX) 500 MG CAPS, Take 500 mg by mouth daily, Disp: , Rfl:      zinc 50 MG TABS, Take 50 mg by mouth daily , Disp: , Rfl:     Allergies - No Known Allergies    Social History -   Social History     Socioeconomic History     Marital status:      Spouse name: Not on file     Number of children: Not on file     Years of education: Not on file     Highest education level: Not on file   Occupational History     Not on file   Social Needs     Financial resource strain: Not on file     Food insecurity     Worry: Not on file     Inability: Not on file     Transportation needs     Medical: Not on file     Non-medical: Not on file   Tobacco Use     Smoking status: Never Smoker     Smokeless tobacco: Never Used   Substance and Sexual Activity     Alcohol use: Yes     Comment: 20 drinks per week     Drug use: No     Sexual activity: Yes     Partners: Male   Lifestyle     Physical activity     Days per week: Not on file     Minutes per session: Not on file     Stress: Not on file   Relationships     Social connections     Talks on phone: Not on file     Gets together: Not on file     Attends Restorationism service: Not on file     Active member of club or organization: Not on file     Attends meetings of clubs or organizations: Not on file     Relationship status: Not on file     Intimate partner violence     Fear of current or ex partner: Not on  file     Emotionally abused: Not on file     Physically abused: Not on file     Forced sexual activity: Not on file   Other Topics Concern     Parent/sibling w/ CABG, MI or angioplasty before 65F 55M? No   Social History Narrative    .    3 adult children           Family History -   Family History   Problem Relation Age of Onset     Diabetes Father      Cardiovascular Father         COPD     Cancer - colorectal Mother      Breast Cancer Sister         Ductal Sarcoma- stage 4     Hypertension No family hx of        Review of Systems - As per HPI and PMHx, otherwise 7 system review is negative.    Physical Exam  General - The patient is in no distress.  Alert and oriented to person and place, answers questions and cooperates with examination appropriately.   Voice and Breathing - The patient was breathing comfortably without the use of accessory muscles. There was no wheezing, stridor, or stertor.  The patients voice was clear and strong.  Ears - The auricles are normal. The tympanic membranes are normal in appearance, bony landmarks are intact.  No retraction, perforation, or masses.  No fluid or purulence was seen in the external canal or the middle ear. No evidence of infection of the middle ear or external canal, cerumen was normal in appearance. She cannot pop ears with valsalva.  Eyes - Extraocular movements intact.  Sclera were not icteric or injected.  Nose - septum midline. No congestion. No polyps.  Neck - Soft, non-tender. Palpation of the occipital, submental, submandibular, internal jugular chain, and supraclavicular nodes did not demonstrate any abnormal lymph nodes or masses. Parotid glands had no masses. Palpation of the thyroid was soft and smooth, with no nodules or goiter appreciated.  The trachea was mobile and midline.  Neurological - Cranial nerves 2 through 12 were grossly intact. House-Brackmann grade 1 out of 6 bilaterally.     Audiologic Studies - An audiogram and tympanogram were  performed today as part of the evaluation and personally reviewed. The tympanogram shows a normal Type A curve, with normal canal volume and middle ear pressure.  There is no sign of eustachian tube dysfunction or middle ear effusion.  The audiogram showed a significant down-sloping high frequency sensorineural hearing loss.  There was no evidence of conductive hearing loss or significant asymmetry.    Assessment and Plan - Shayne Douglass is a 65 year old female who presents to me today with asymmetric sensorineural hearing loss.  She reports worsening plugging of the ears.  She cannot pop ears.  She has [facility] that [facility] has told her on yearly audiograms that her hearing is slowly declining both ears.  I think this plugged feeling is most likely the decline in hearing.  She can continue the left hearing aid and may need to consider right hearing aid.  However I would have to look at her hearing test to know for sure the degree of hearing loss in both ears.  She may have a degree of eustachian tube dysfunction and I encouraged her to try to Valsalva and pop her ears.  She could not today.  She tried Sudafed for 2 weeks but this did not help and she can stop it.  She also has TMJ and this can present as fullness, pressure, or plugging of the ears.  I advised her to make sure that that is not causing a problem.    Hamzah Alvarado MD  Otolaryngology  [facility]

## 2020-12-02 NOTE — LETTER
12/2/2020         RE: Shayne Douglass  [address] [place]  [place] [address]        Dear Colleague,    Thank you for referring your patient, Shayne Douglass, to the [facility]. Please see a copy of my visit note below.    Chief Complaint - Hearing loss, plugging in ears    History of Present Illness - Shayne Douglass is a 65 year old female who presents to me today with hearing loss in the left ear. She notes chronic plugging or ETD in both ears. She had left-sided hearing loss that has been present and noticeable for approximately years. I saw her in 2016 when she had sudden onset sensorineural hearing loss left side. MRI brain was normal. She never followed-up. She has popping in ears when she would ride an elevator. She tries yawning, but they won't pop. No pain. She wears a hearing aid in the left. She goes to [facility]. She has tried sudafed for 2 weeks. It hasn't helped. There is no history of chronic ear disease or ear surgery. She was told by [employer] that her hearing has declined a little in both ears.     Past Medical History -   Patient Active Problem List   Diagnosis     Family hx of colon cancer     Recurrent cold sores     Vitamin D deficiency     Alcohol problem drinking     Abnormal Pap smear of cervix     Bunion     Hyperlipidemia LDL goal <160     Advanced directives, counseling/discussion     Localized osteoarthrosis, shoulder region     Scapular dyskinesis     Impingement syndrome, shoulder     Pain in joint, shoulder region     Family history of breast cancer in sister     Chronic low back pain with sciatica, sciatica laterality unspecified, unspecified back pain laterality     Alcoholism /alcohol abuse (H)       Current Medications -   Current Outpatient Medications:      acyclovir (ZOVIRAX) 200 MG capsule, Take 1-2 capsules (200-400 mg) by mouth 5 times daily TAKE AS DIRECTED FOR COLD SORES, Disp: 25 capsule, Rfl: 1     ascorbic acid (VITAMIN C) 1000  MG TABS, Take 1,000 mg by mouth daily., Disp: , Rfl:      Calcium Carbonate-Vit D-Min (CALCIUM 1200 PO), Take  by mouth daily., Disp: , Rfl:      Cholecalciferol (VITAMIN D-3 PO), Take 4,000 mg by mouth., Disp: , Rfl:      co-enzyme Q-10 100 MG CAPS capsule, Take by mouth daily, Disp: , Rfl:      fish oil-omega-3 fatty acids (FISH OIL) 1000 MG capsule, ONCE DAILY, Disp: , Rfl:      MAGNESIUM PO, Take  by mouth., Disp: , Rfl:      metroNIDAZOLE (METROCREAM) 0.75 % external cream, Apply twice daily to the face for rosacea., Disp: 45 g, Rfl: 6     metroNIDAZOLE (METROGEL) 0.75 % external gel, Apply topically daily as needed., Disp: 30 g, Rfl: 1     Milk Thistle 175 MG TABS, Take 175 mg by mouth daily, Disp: , Rfl:      multivitamin w/minerals (MULTI-VITAMIN) tablet, Take 1 tablet by mouth daily., Disp: , Rfl:      Turmeric (QC TUMERIC COMPLEX) 500 MG CAPS, Take 500 mg by mouth daily, Disp: , Rfl:      zinc 50 MG TABS, Take 50 mg by mouth daily , Disp: , Rfl:     Allergies - No Known Allergies    Social History -   Social History     Socioeconomic History     Marital status:      Spouse name: Not on file     Number of children: Not on file     Years of education: Not on file     Highest education level: Not on file   Occupational History     Not on file   Social Needs     Financial resource strain: Not on file     Food insecurity     Worry: Not on file     Inability: Not on file     Transportation needs     Medical: Not on file     Non-medical: Not on file   Tobacco Use     Smoking status: Never Smoker     Smokeless tobacco: Never Used   Substance and Sexual Activity     Alcohol use: Yes     Comment: 20 drinks per week     Drug use: No     Sexual activity: Yes     Partners: Male   Lifestyle     Physical activity     Days per week: Not on file     Minutes per session: Not on file     Stress: Not on file   Relationships     Social connections     Talks on phone: Not on file     Gets together: Not on file      Attends Mandaen service: Not on file     Active member of club or organization: Not on file     Attends meetings of clubs or organizations: Not on file     Relationship status: Not on file     Intimate partner violence     Fear of current or ex partner: Not on file     Emotionally abused: Not on file     Physically abused: Not on file     Forced sexual activity: Not on file   Other Topics Concern     Parent/sibling w/ CABG, MI or angioplasty before 65F 55M? No   Social History Narrative    .    3 adult children           Family History -   Family History   Problem Relation Age of Onset     Diabetes Father      Cardiovascular Father         COPD     Cancer - colorectal Mother      Breast Cancer Sister         Ductal Sarcoma- stage 4     Hypertension No family hx of        Review of Systems - As per HPI and PMHx, otherwise 7 system review is negative.    Physical Exam  General - The patient is in no distress.  Alert and oriented to person and place, answers questions and cooperates with examination appropriately.   Voice and Breathing - The patient was breathing comfortably without the use of accessory muscles. There was no wheezing, stridor, or stertor.  The patients voice was clear and strong.  Ears - The auricles are normal. The tympanic membranes are normal in appearance, bony landmarks are intact.  No retraction, perforation, or masses.  No fluid or purulence was seen in the external canal or the middle ear. No evidence of infection of the middle ear or external canal, cerumen was normal in appearance. She cannot pop ears with valsalva.  Eyes - Extraocular movements intact.  Sclera were not icteric or injected.  Nose - septum midline. No congestion. No polyps.  Neck - Soft, non-tender. Palpation of the occipital, submental, submandibular, internal jugular chain, and supraclavicular nodes did not demonstrate any abnormal lymph nodes or masses. Parotid glands had no masses. Palpation of the thyroid was  soft and smooth, with no nodules or goiter appreciated.  The trachea was mobile and midline.  Neurological - Cranial nerves 2 through 12 were grossly intact. House-Brackmann grade 1 out of 6 bilaterally.     Audiologic Studies - An audiogram and tympanogram were performed today as part of the evaluation and personally reviewed. The tympanogram shows a normal Type A curve, with normal canal volume and middle ear pressure.  There is no sign of eustachian tube dysfunction or middle ear effusion.  The audiogram showed a significant down-sloping high frequency sensorineural hearing loss.  There was no evidence of conductive hearing loss or significant asymmetry.    Assessment and Plan - Shayne Douglass is a 65 year old female who presents to me today with asymmetric sensorineural hearing loss.  She reports worsening plugging of the ears.  She cannot pop ears.  [facility] has noted that [facility] has told her on yearly audiograms that her hearing is slowly declining both ears.  I think this plugged feeling is most likely the decline in hearing.  She can continue the left hearing aid and may need to consider right hearing aid.  However I would have to look at her hearing test to know for sure the degree of hearing loss in both ears.  She may have a degree of eustachian tube dysfunction and I encouraged her to try to Valsalva and pop her ears.  She could not today.  She tried Sudafed for 2 weeks but this did not help and she can stop it.  She also has TMJ and this can present as fullness, pressure, or plugging of the ears.  I advised her to make sure that that is not causing a problem.    Hamzah Alvarado MD  Otolaryngology  [facility]          Again, thank you for allowing me to participate in the care of your patient.        Sincerely,        Hamzah Alvarado MD

## 2021-06-10 ENCOUNTER — ANCILLARY PROCEDURE (OUTPATIENT)
Dept: MAMMOGRAPHY | Facility: CLINIC | Age: 66
End: 2021-06-10
Attending: NURSE PRACTITIONER
Payer: COMMERCIAL

## 2021-06-10 DIAGNOSIS — Z12.31 VISIT FOR SCREENING MAMMOGRAM: ICD-10-CM

## 2021-06-10 PROCEDURE — 77067 SCR MAMMO BI INCL CAD: CPT | Mod: TC | Performed by: RADIOLOGY

## 2021-12-20 ENCOUNTER — NURSE TRIAGE (OUTPATIENT)
Dept: NURSING | Facility: CLINIC | Age: 66
End: 2021-12-20
Payer: COMMERCIAL

## 2021-12-20 NOTE — TELEPHONE ENCOUNTER
Patient calling reporting she has had COVID for the past 2 weeks with ongoing cough, congestion and overall not feeling well. Reports having a low grade fever of 100 for multiple days but thinks this has gone away. Tried Hydroxychloroquin, Zinc and Vitamin D with little relief. Denies difficulty breathing and chest pain. Advised at this point symptom management is what is recommended. Reviewed signs and symptoms to monitor for that would require an ER visit. Patient expressed understanding. Home care advice provided per protocol. Call back instructions provided. Patient agreeable to plan.     Leah Eisenberg RN 12/20/21 11:24 AM   [facility] Triage Nurse Advisor      Reason for Disposition    COVID-19 Disease, questions about    Additional Information    Negative: SEVERE difficulty breathing (e.g., struggling for each breath, speaks in single words)    Negative: Difficult to awaken or acting confused (e.g., disoriented, slurred speech)    Negative: Bluish (or gray) lips or face now    Negative: Shock suspected (e.g., cold/pale/clammy skin, too weak to stand, low BP, rapid pulse)    Negative: Sounds like a life-threatening emergency to the triager    Negative: [1] COVID-19 exposure AND [2] no symptoms    Negative: COVID-19 vaccine reaction suspected (e.g., fever, headache, muscle aches) occurring 1 to 3 days after getting vaccine    Negative: COVID-19 vaccine, questions about    Negative: [1] Lives with someone known to have influenza (flu test positive) AND [2] flu-like symptoms (e.g., cough, runny nose, sore throat, SOB; with or without fever)    Negative: [1] Adult with possible COVID-19 symptoms AND [2] triager concerned about severity of symptoms or other causes    Negative: COVID-19 and breastfeeding, questions about    Negative: SEVERE or constant chest pain or pressure (Exception: mild central chest pain, present only when coughing)    Negative: MODERATE difficulty breathing (e.g., speaks in phrases, SOB even at  rest, pulse 100-120)    Negative: [1] Headache AND [2] stiff neck (can't touch chin to chest)    Negative: MILD difficulty breathing (e.g., minimal/no SOB at rest, SOB with walking, pulse <100)    Negative: Chest pain or pressure    Negative: Patient sounds very sick or weak to the triager    Negative: Fever > 103 F (39.4 C)    Negative: [1] Fever > 101 F (38.3 C) AND [2] age > 60 years    Negative: [1] Fever > 100.0 F (37.8 C) AND [2] bedridden (e.g., nursing home patient, CVA, chronic illness, recovering from surgery)    Negative: HIGH RISK for severe COVID complications (e.g., age > 64 years, obesity with BMI > 25, pregnant, chronic lung disease or other chronic medical condition)  (Exception: Already seen by PCP and no new or worsening symptoms.)    Negative: [1] HIGH RISK patient AND [2] influenza is widespread in the community AND [3] ONE OR MORE respiratory symptoms: cough, sore throat, runny or stuffy nose    Negative: [1] HIGH RISK patient AND [2] influenza exposure within the last 7 days AND [3] ONE OR MORE respiratory symptoms: cough, sore throat, runny or stuffy nose    Negative: [1] COVID-19 infection suspected by caller or triager AND [2] mild symptoms (cough, fever, or others) AND [3] negative COVID-19 rapid test    Negative: Fever present > 3 days (72 hours)    Negative: [1] Fever returns after gone for over 24 hours AND [2] symptoms worse or not improved    Negative: [1] Continuous (nonstop) coughing interferes with work or school AND [2] no improvement using cough treatment per protocol    Negative: COVID-19 Prevention and Healthy Living, questions about    Negative: COVID-19 Testing, questions about    Negative: COVID-19 Home Isolation, questions about    Negative: [1] COVID-19 diagnosed AND [2] has mild nausea, vomiting or diarrhea    Negative: [1] COVID-19 diagnosed by doctor (or NP/PA) AND [2] mild symptoms (e.g., cough, fever, others) AND [3] no complications or SOB    Negative: [1] COVID-19  diagnosed by positive lab test AND [2] NO symptoms (e.g., cough, fever, others)    Negative: [1] COVID-19 diagnosed by positive lab test AND [2] mild symptoms (e.g., cough, fever, others) AND [3] no complications or SOB    Negative: Cough present > 3 weeks    Protocols used: CORONAVIRUS (COVID-19) DIAGNOSED OR [id number] 8.25.2021

## 2022-02-17 PROBLEM — F10.20 ALCOHOL DEPENDENCE, UNCOMPLICATED (H): Status: ACTIVE | Noted: 2020-07-13

## 2022-03-22 DIAGNOSIS — B00.1 RECURRENT COLD SORES: ICD-10-CM

## 2022-03-24 RX ORDER — ACYCLOVIR 200 MG/1
200-400 CAPSULE ORAL
Qty: 25 CAPSULE | Refills: 1 | Status: SHIPPED | OUTPATIENT
Start: 2022-03-24

## 2022-03-24 NOTE — TELEPHONE ENCOUNTER
"Routing refill request to provider for review/approval because:  Labs not current:  Cr.   It has been over a year since lats visit      Requested Prescriptions   Pending Prescriptions Disp Refills     acyclovir (ZOVIRAX) 200 MG capsule [Pharmacy Med Name: ACYCLOVIR 200 MG CAPSULE] 25 capsule 1     Sig: TAKE 1-2 CAPSULES (200-400 MG) BY MOUTH 5 TIMES DAILY TAKE AS DIRECTED FOR COLD SORES       Antivirals for Herpes Protocol Failed - 3/22/2022  9:45 AM        Failed - Recent (12 mo) or future (30 days) visit within the authorizing provider's specialty     Patient has had an office visit with the authorizing provider or a provider within the authorizing providers department within the previous 12 mos or has a future within next 30 days. See \"Patient Info\" tab in inbasket, or \"Choose Columns\" in Meds & Orders section of the refill encounter.              Failed - Normal serum creatinine on file in past 12 months     Recent Labs   Lab Test 02/19/14  0819   CR 0.75       Ok to refill medication if creatinine is low          Passed - Patient is age 12 or older        Passed - Medication is active on med list           Aleyda Iraheta RN    "

## 2024-02-16 ENCOUNTER — TELEPHONE (OUTPATIENT)
Dept: FAMILY MEDICINE | Facility: CLINIC | Age: 69
End: 2024-02-16
Payer: COMMERCIAL

## 2024-02-16 NOTE — TELEPHONE ENCOUNTER
Medication Question or Refill        What medication are you calling about (include dose and sig)?: ACYCLOVIR 200MG    Preferred Pharmacy:       [facility]/pharmacy #[id number] - [place], [place] - [id number]  [place] [address]  Phone: [phone] Fax: [phone]      Controlled Substance Agreement on file:   CSA -- Patient Level:    CSA: None found at the patient level.       Who prescribed the medication?: Sol Anderson APRN CNP     Do you need a refill? Yes    When did you use the medication last? PRN, almost out.    Patient offered an appointment? Yes: Pt is wondering if she can get it filled instead    Do you have any questions or concerns?  Yes: primary care provider is no longer with [facility], pt doesn't have a pcp as of yet. Requesting to see if pt can get the medication filled.       Okay to leave a detailed message?: Yes at Home number on file [phone] (home)

## 2024-02-16 NOTE — TELEPHONE ENCOUNTER
Spoke with patient.    Advised to patient she will need to re-establish care within [facility] to be seen for acute concerns, otherwise advising patient to be seen in urgent care at this time.     Patient verbalized understanding and has no further questions at this time.     BALWINDER MonahanN RN  [facility]

## 2025-06-06 NOTE — MR AVS SNAPSHOT
"              After Visit Summary   3/29/2018    Shayne Douglass    MRN: [id number]           Patient Information     Date Of Birth          1955        Visit Information        Provider Department      3/29/2018 9:00 AM Ras White MD [facility]        Today's Diagnoses     WILLIAMS (stress urinary incontinence, female)    -  1       Follow-ups after your visit        Who to contact     If you have questions or need follow up information about today's clinic visit or your schedule please contact [facility] directly at [phone].  Normal or non-critical lab and imaging results will be communicated to you by [facility], letter or phone within 4 business days after the clinic has received the results. If you do not hear from us within 7 days, please contact the clinic through [facility]art or phone. If you have a critical or abnormal lab result, we will notify you by phone as soon as possible.  Submit refill requests through [employer] or call your pharmacy and they will forward the refill request to us. Please allow 3 business days for your refill to be completed.          Additional Information About Your Visit        MyChart Information     [employer] lets you send messages to your doctor, view your test results, renew your prescriptions, schedule appointments and more. To sign up, go to [email or web address] . Click on \"Log in\" on the left side of the screen, which will take you to the Welcome page. Then click on \"Sign up Now\" on the right side of the page.     You will be asked to enter the access code listed below, as well as some personal information. Please follow the directions to create your username and password.     Your access code is: [email or web address]  Expires: 2018  1:44 PM     Your access code will  in 90 days. If you need help or a new code, please call your [facility] or [phone].        Care EveryWhere ID     This is your Care EveryWhere ID. This could be " used by other organizations to access your [facility] medical records  [id number]        Your Vitals Were     Pulse Pulse Oximetry                71 98%           Blood Pressure from Last 3 Encounters:   02/28/18 122/70   06/22/16 128/66   05/25/16 114/76    Weight from Last 3 Encounters:   02/28/18 78 kg (172 lb)   06/23/16 75.8 kg (167 lb)   06/22/16 75.8 kg (167 lb)              We Performed the Following     CYSTOURETHROSCOPY (52654)     Kristie-Operative Worksheet Urology     UA reflex to Microscopic     Urine Microscopic          Today's Medication Changes          These changes are accurate as of 3/29/18 10:03 AM.  If you have any questions, ask your nurse or doctor.               Stop taking these medicines if you haven't already. Please contact your care team if you have questions.     predniSONE 20 MG tablet   Commonly known as:  DELTASONE                    Primary Care Provider Office Phone # Fax #    Amber Gracia [phone]       [facility] [facility] [address]        Equal Access to Services     TOMASA HOLT : Hadii greyson lin hadasho Soomaali, waaxda luqadaha, qaybta kaalmada adedarrylyalorena, soy landaverde . So [facility] [phone].    ATENCIÓN: Si habla español, tiene a garcia disposición servicios gratuitos de asistencia lingüística. Llame al [phone].    We comply with applicable federal civil rights laws and [place] laws. We do not discriminate on the basis of race, color, national origin, age, disability, sex, sexual orientation, or gender identity.            Thank you!     Thank you for choosing [facility]  for your care. Our goal is always to provide you with excellent care. Hearing back from our patients is one way we can continue to improve our services. Please take a few minutes to complete the written survey that you may receive in the mail after your visit with us. Thank you!             Your Updated Medication List - Protect  others around you: Learn how to safely use, store and throw away your medicines at www.disposemymeds.org.          This list is accurate as of 3/29/18 10:03 AM.  Always use your most recent med list.                   Brand Name Dispense Instructions for use Diagnosis    acyclovir 200 MG capsule    ZOVIRAX    25 capsule    Take 1-2 capsules (200-400 mg) by mouth 5 times daily TAKE AS DIRECTED FOR COLD SORES    Recurrent cold sores       ascorbic acid 1000 MG Tabs    vitamin C     Take 1,000 mg by mouth daily.        CALCIUM 1200 PO      Take  by mouth daily.        fish oil-omega-3 fatty acids 1000 MG capsule      ONCE DAILY        MAGNESIUM PO      Take  by mouth.        metroNIDAZOLE 0.75 % topical gel    METROGEL    30 g    Apply topically daily as needed.    Vaginal irritation       Multi-vitamin Tabs tablet      Take 1 tablet by mouth daily.        order for DME     30 g    Profile Rx: patient will contact pharmacy when needed   Hydrocortisone 2.5% cream and then added 0.3 grams of Clotrimazole Powder to make it 1%. Previous pharmacy:  [facility] and the pharmacist said that they used 30 grams    Candidal intertrigo       VITAMIN D-3 PO      Take 4,000 mg by mouth.           Vaccine status unknown

## (undated) DEVICE — GLOVE PROTEXIS W/NEU-THERA 8.0  2D73TE80

## (undated) DEVICE — SU VICRYL 2-0 UR-5 27" J375H

## (undated) DEVICE — DECANTER TRANSFER DEVICE 2008S

## (undated) DEVICE — PAD PERI W/WINGS 1580A

## (undated) DEVICE — SOL NACL 0.9% INJ 100ML BAG 2B1307

## (undated) DEVICE — PREP SKIN SCRUB TRAY 4461A

## (undated) DEVICE — RETR ELASTIC STAYS 3311-1G

## (undated) DEVICE — NDL 19GA 1.5"

## (undated) DEVICE — SYR BULB IRRIG DOVER 60 ML LATEX FREE 67000

## (undated) DEVICE — DRAPE GYN/UROLOGY FLUID POUCH TUR 29455

## (undated) DEVICE — TUBING IRRIG TUR Y TYPE 96" LF 6543-01

## (undated) DEVICE — SU VICRYL 4-0 SH 27" UND J415H

## (undated) DEVICE — RETR RING LONE STAR 14.1X14.1CM 3307G

## (undated) DEVICE — PACK MINOR SBA15MIFSE

## (undated) DEVICE — SPONGE PACK VAGINAL 2"X9

## (undated) DEVICE — SOL WATER IRRIG 1000ML BOTTLE 07139-09

## (undated) DEVICE — SYR 10ML LL W/O NDL

## (undated) DEVICE — DRAPE VAGI BAG 18X9" 1072

## (undated) DEVICE — SUCTION TIP YANKAUER W/O VENT K86

## (undated) DEVICE — CATH FOLEY 16FR 5ML SIL 165816

## (undated) RX ORDER — LIDOCAINE HYDROCHLORIDE 20 MG/ML
INJECTION, SOLUTION EPIDURAL; INFILTRATION; INTRACAUDAL; PERINEURAL
Status: DISPENSED
Start: 2018-05-07

## (undated) RX ORDER — ACETAMINOPHEN 325 MG/1
TABLET ORAL
Status: DISPENSED
Start: 2018-05-07

## (undated) RX ORDER — GABAPENTIN 300 MG/1
CAPSULE ORAL
Status: DISPENSED
Start: 2018-05-07

## (undated) RX ORDER — BACITRACIN 50000 [IU]/1
INJECTION, POWDER, FOR SOLUTION INTRAMUSCULAR
Status: DISPENSED
Start: 2018-05-07

## (undated) RX ORDER — PROPOFOL 10 MG/ML
INJECTION, EMULSION INTRAVENOUS
Status: DISPENSED
Start: 2018-05-07

## (undated) RX ORDER — OXYCODONE HYDROCHLORIDE 5 MG/1
TABLET ORAL
Status: DISPENSED
Start: 2018-05-07

## (undated) RX ORDER — FENTANYL CITRATE 50 UG/ML
INJECTION, SOLUTION INTRAMUSCULAR; INTRAVENOUS
Status: DISPENSED
Start: 2018-05-07

## (undated) RX ORDER — CEFAZOLIN SODIUM 2 G/100ML
INJECTION, SOLUTION INTRAVENOUS
Status: DISPENSED
Start: 2018-05-07

## (undated) RX ORDER — POLYMYXIN B SULFATE 500000 [IU]/1
INJECTION, POWDER, LYOPHILIZED, FOR SOLUTION INTRAMUSCULAR; INTRATHECAL; INTRAVENOUS; OPHTHALMIC
Status: DISPENSED
Start: 2018-05-07